# Patient Record
Sex: FEMALE | Race: WHITE | NOT HISPANIC OR LATINO | Employment: OTHER | URBAN - METROPOLITAN AREA
[De-identification: names, ages, dates, MRNs, and addresses within clinical notes are randomized per-mention and may not be internally consistent; named-entity substitution may affect disease eponyms.]

---

## 2017-07-18 ENCOUNTER — CONVERSION ENCOUNTER (OUTPATIENT)
Dept: MAMMOGRAPHY | Facility: HOSPITAL | Age: 77
End: 2017-07-18

## 2018-01-08 ENCOUNTER — CONVERSION ENCOUNTER (OUTPATIENT)
Dept: ORTHOPEDIC SURGERY | Facility: CLINIC | Age: 78
End: 2018-01-08

## 2018-01-08 ENCOUNTER — OFFICE VISIT CONVERTED (OUTPATIENT)
Dept: ORTHOPEDIC SURGERY | Facility: CLINIC | Age: 78
End: 2018-01-08
Attending: ORTHOPAEDIC SURGERY

## 2018-04-03 ENCOUNTER — OFFICE VISIT CONVERTED (OUTPATIENT)
Dept: CARDIOLOGY | Facility: CLINIC | Age: 78
End: 2018-04-03
Attending: SPECIALIST

## 2018-04-03 ENCOUNTER — CONVERSION ENCOUNTER (OUTPATIENT)
Dept: CARDIOLOGY | Facility: CLINIC | Age: 78
End: 2018-04-03

## 2018-04-24 ENCOUNTER — OFFICE VISIT CONVERTED (OUTPATIENT)
Dept: UROLOGY | Facility: CLINIC | Age: 78
End: 2018-04-24
Attending: NURSE PRACTITIONER

## 2018-04-24 ENCOUNTER — CONVERSION ENCOUNTER (OUTPATIENT)
Dept: UROLOGY | Facility: CLINIC | Age: 78
End: 2018-04-24

## 2018-05-10 ENCOUNTER — OFFICE VISIT CONVERTED (OUTPATIENT)
Dept: UROLOGY | Facility: CLINIC | Age: 78
End: 2018-05-10
Attending: UROLOGY

## 2018-05-10 ENCOUNTER — CONVERSION ENCOUNTER (OUTPATIENT)
Dept: UROLOGY | Facility: CLINIC | Age: 78
End: 2018-05-10

## 2018-09-12 ENCOUNTER — OFFICE VISIT CONVERTED (OUTPATIENT)
Dept: ORTHOPEDIC SURGERY | Facility: CLINIC | Age: 78
End: 2018-09-12
Attending: PHYSICIAN ASSISTANT

## 2018-09-12 ENCOUNTER — CONVERSION ENCOUNTER (OUTPATIENT)
Dept: ORTHOPEDIC SURGERY | Facility: CLINIC | Age: 78
End: 2018-09-12

## 2018-10-02 ENCOUNTER — CONVERSION ENCOUNTER (OUTPATIENT)
Dept: UROLOGY | Facility: CLINIC | Age: 78
End: 2018-10-02

## 2018-10-02 ENCOUNTER — OFFICE VISIT CONVERTED (OUTPATIENT)
Dept: UROLOGY | Facility: CLINIC | Age: 78
End: 2018-10-02
Attending: UROLOGY

## 2018-10-04 ENCOUNTER — OFFICE VISIT CONVERTED (OUTPATIENT)
Dept: OTHER | Facility: HOSPITAL | Age: 78
End: 2018-10-04
Attending: NURSE PRACTITIONER

## 2018-10-04 ENCOUNTER — CONVERSION ENCOUNTER (OUTPATIENT)
Dept: OTHER | Facility: HOSPITAL | Age: 78
End: 2018-10-04

## 2018-10-09 ENCOUNTER — OFFICE VISIT CONVERTED (OUTPATIENT)
Dept: CARDIOLOGY | Facility: CLINIC | Age: 78
End: 2018-10-09
Attending: SPECIALIST

## 2018-10-09 ENCOUNTER — CONVERSION ENCOUNTER (OUTPATIENT)
Dept: CARDIOLOGY | Facility: CLINIC | Age: 78
End: 2018-10-09

## 2019-01-04 ENCOUNTER — HOSPITAL ENCOUNTER (OUTPATIENT)
Dept: OTHER | Facility: HOSPITAL | Age: 79
Discharge: HOME | End: 2019-01-04
Attending: NURSE PRACTITIONER

## 2019-01-04 LAB
ANION GAP SERPL CALC-SCNC: 15 MMOL/L (ref 8–19)
BASOPHILS # BLD AUTO: 0.04 10*3/UL (ref 0–0.2)
BASOPHILS # BLD: 0 % (ref 0–3)
BASOPHILS NFR BLD AUTO: 1.01 % (ref 0–3)
BUN SERPL-MCNC: 32 MG/DL (ref 5–25)
BUN/CREAT SERPL: 28 {RATIO} (ref 6–20)
CALCIUM SERPL-MCNC: 10.2 MG/DL (ref 8.7–10.4)
CHLORIDE SERPL-SCNC: 103 MMOL/L (ref 99–111)
CONV ABS BANDS: 0 % (ref 1–5)
CONV ANISOCYTES: SLIGHT
CONV BASOPHILIC STIPPLING IN BLOOD BY LIGHT MICROSCOPY: ABNORMAL
CONV CO2: 31 MMOL/L (ref 22–32)
CONV HYPOCHROMIA IN BLOOD BY LIGHT MICROSCOPY: SLIGHT
CONV SEGMENTED NEUTROPHILS: 61 % (ref 45–70)
CREAT UR-MCNC: 1.15 MG/DL (ref 0.5–0.9)
EOSINOPHIL # BLD AUTO: 0.17 10*3/UL (ref 0–0.7)
EOSINOPHIL # BLD AUTO: 4.62 % (ref 0–7)
EOSINOPHIL NFR BLD AUTO: 3 % (ref 0–7)
ERYTHROCYTE [DISTWIDTH] IN BLOOD BY AUTOMATED COUNT: 12.5 % (ref 11.5–14.5)
GFR SERPLBLD BASED ON 1.73 SQ M-ARVRAT: 45 ML/MIN/{1.73_M2}
GIANT PLATELETS: ABNORMAL
GLUCOSE SERPL-MCNC: 90 MG/DL (ref 65–99)
HBA1C MFR BLD: 14 G/DL (ref 12–16)
HCT VFR BLD AUTO: 42 % (ref 37–47)
HYPOGRANULAR PLATELETS: SLIGHT
LARGE PLATELETS: SLIGHT
LYMPHOCYTES # BLD AUTO: 0.85 10*3/UL (ref 1–5)
MACROCYTES BLD QL SMEAR: SLIGHT
MCH RBC QN AUTO: 29.8 PG (ref 27–31)
MCHC RBC AUTO-ENTMCNC: 33.3 G/DL (ref 33–37)
MCV RBC AUTO: 89.6 FL (ref 81–99)
MICROCYTES BLD QL: SLIGHT
MONOCYTES # BLD AUTO: 0.36 10*3/UL (ref 0.2–1.2)
MONOCYTES NFR BLD AUTO: 9.79 % (ref 3–10)
MONOCYTES NFR BLD MANUAL: 11 % (ref 3–10)
NEUTROPHILS # BLD AUTO: 2.25 10*3/UL (ref 2–8)
NEUTROPHILS NFR BLD AUTO: 61.3 % (ref 30–85)
NRBC BLD AUTO-RTO: 0 % (ref 0–0.01)
NUC CELL # PRT MANUAL: 0 /100{WBCS}
OSMOLALITY SERPL CALC.SUM OF ELEC: 304 MOSM/KG (ref 273–304)
OVALOCYTES BLD QL SMEAR: ABNORMAL
PLAT MORPH BLD: NORMAL
PLATELET # BLD AUTO: 158 10*3/UL (ref 130–400)
PMV BLD AUTO: 8.9 FL (ref 7.4–10.4)
POIKILOCYTOSIS BLD QL SMEAR: SLIGHT
POLYCHROMASIA BLD QL SMEAR: ABNORMAL
POTASSIUM SERPL-SCNC: 4.7 MMOL/L (ref 3.5–5.3)
RBC # BLD AUTO: 4.69 10*6/UL (ref 4.2–5.4)
SMALL PLATELETS BLD QL SMEAR: ADEQUATE
SODIUM SERPL-SCNC: 144 MMOL/L (ref 135–147)
SPHEROCYTES BLD QL SMEAR: ABNORMAL
STOMATOCYTES BLD QL SMEAR: SLIGHT
VARIANT LYMPHS NFR BLD MANUAL: 23.2 % (ref 20–45)
VARIANT LYMPHS NFR BLD MANUAL: 25 % (ref 20–45)
WBC # BLD AUTO: 3.67 10*3/UL (ref 4.8–10.8)

## 2019-01-09 ENCOUNTER — HOSPITAL ENCOUNTER (OUTPATIENT)
Dept: MAMMOGRAPHY | Facility: HOSPITAL | Age: 79
Discharge: HOME | End: 2019-01-09
Attending: OBSTETRICS & GYNECOLOGY

## 2019-03-19 ENCOUNTER — OFFICE VISIT CONVERTED (OUTPATIENT)
Dept: OTHER | Facility: HOSPITAL | Age: 79
End: 2019-03-19
Attending: NURSE PRACTITIONER

## 2019-03-19 ENCOUNTER — HOSPITAL ENCOUNTER (OUTPATIENT)
Dept: OTHER | Facility: HOSPITAL | Age: 79
Discharge: HOME | End: 2019-03-19
Attending: NURSE PRACTITIONER

## 2019-03-19 LAB
ALBUMIN SERPL-MCNC: 4 G/DL (ref 3.5–5)
ALBUMIN/GLOB SERPL: 1.5 {RATIO} (ref 1.4–2.6)
ALP SERPL-CCNC: 42 U/L (ref 43–160)
ALT SERPL-CCNC: 16 U/L (ref 10–40)
ANION GAP SERPL CALC-SCNC: 12 MMOL/L (ref 8–19)
AST SERPL-CCNC: 28 U/L (ref 15–50)
BASOPHILS # BLD AUTO: 0.02 10*3/UL (ref 0–0.2)
BASOPHILS NFR BLD AUTO: 0.6 % (ref 0–3)
BILIRUB SERPL-MCNC: 0.4 MG/DL (ref 0.2–1.3)
BUN SERPL-MCNC: 29 MG/DL (ref 5–25)
BUN/CREAT SERPL: 28 {RATIO} (ref 6–20)
CALCIUM SERPL-MCNC: 10.1 MG/DL (ref 8.7–10.4)
CHLORIDE SERPL-SCNC: 105 MMOL/L (ref 99–111)
CHOLEST SERPL-MCNC: 127 MG/DL (ref 107–200)
CHOLEST/HDLC SERPL: 3.3 {RATIO} (ref 3–6)
CONV ABS IMM GRAN: 0.01 10*3/UL (ref 0–0.2)
CONV CO2: 31 MMOL/L (ref 22–32)
CONV IMMATURE GRAN: 0.3 % (ref 0–1.8)
CONV TOTAL PROTEIN: 6.7 G/DL (ref 6.3–8.2)
CREAT UR-MCNC: 1.05 MG/DL (ref 0.5–0.9)
DEPRECATED RDW RBC AUTO: 49.7 FL (ref 36.4–46.3)
EOSINOPHIL # BLD AUTO: 0.15 10*3/UL (ref 0–0.7)
EOSINOPHIL # BLD AUTO: 4.2 % (ref 0–7)
ERYTHROCYTE [DISTWIDTH] IN BLOOD BY AUTOMATED COUNT: 14.6 % (ref 11.7–14.4)
GFR SERPLBLD BASED ON 1.73 SQ M-ARVRAT: 50 ML/MIN/{1.73_M2}
GLOBULIN UR ELPH-MCNC: 2.7 G/DL (ref 2–3.5)
GLUCOSE SERPL-MCNC: 95 MG/DL (ref 65–99)
HBA1C MFR BLD: 12.9 G/DL (ref 12–16)
HCT VFR BLD AUTO: 40 % (ref 37–47)
HDLC SERPL-MCNC: 39 MG/DL (ref 40–60)
LDLC SERPL CALC-MCNC: 61 MG/DL (ref 70–100)
LYMPHOCYTES # BLD AUTO: 0.69 10*3/UL (ref 1–5)
MCH RBC QN AUTO: 30.1 PG (ref 27–31)
MCHC RBC AUTO-ENTMCNC: 32.3 G/DL (ref 33–37)
MCV RBC AUTO: 93.5 FL (ref 81–99)
MONOCYTES # BLD AUTO: 0.31 10*3/UL (ref 0.2–1.2)
MONOCYTES NFR BLD AUTO: 8.7 % (ref 3–10)
NEUTROPHILS # BLD AUTO: 2.39 10*3/UL (ref 2–8)
NEUTROPHILS NFR BLD AUTO: 66.9 % (ref 30–85)
NRBC CBCN: 0 % (ref 0–0.7)
OSMOLALITY SERPL CALC.SUM OF ELEC: 304 MOSM/KG (ref 273–304)
PLATELET # BLD AUTO: 135 10*3/UL (ref 130–400)
PMV BLD AUTO: 12.1 FL (ref 9.4–12.3)
POTASSIUM SERPL-SCNC: 4.4 MMOL/L (ref 3.5–5.3)
RBC # BLD AUTO: 4.28 10*6/UL (ref 4.2–5.4)
SODIUM SERPL-SCNC: 144 MMOL/L (ref 135–147)
T4 FREE SERPL-MCNC: 1.6 NG/DL (ref 0.9–1.8)
TRIGL SERPL-MCNC: 134 MG/DL (ref 40–150)
TSH SERPL-ACNC: 4.6 M[IU]/L (ref 0.27–4.2)
VARIANT LYMPHS NFR BLD MANUAL: 19.3 % (ref 20–45)
VLDLC SERPL-MCNC: 27 MG/DL (ref 5–37)
WBC # BLD AUTO: 3.57 10*3/UL (ref 4.8–10.8)

## 2019-04-02 ENCOUNTER — OFFICE VISIT CONVERTED (OUTPATIENT)
Dept: UROLOGY | Facility: CLINIC | Age: 79
End: 2019-04-02
Attending: NURSE PRACTITIONER

## 2019-04-02 ENCOUNTER — CONVERSION ENCOUNTER (OUTPATIENT)
Dept: UROLOGY | Facility: CLINIC | Age: 79
End: 2019-04-02

## 2019-04-09 ENCOUNTER — OFFICE VISIT CONVERTED (OUTPATIENT)
Dept: CARDIOLOGY | Facility: CLINIC | Age: 79
End: 2019-04-09
Attending: SPECIALIST

## 2019-04-18 ENCOUNTER — OFFICE VISIT CONVERTED (OUTPATIENT)
Dept: OTHER | Facility: HOSPITAL | Age: 79
End: 2019-04-18
Attending: NURSE PRACTITIONER

## 2019-04-18 ENCOUNTER — CONVERSION ENCOUNTER (OUTPATIENT)
Dept: OTHER | Facility: HOSPITAL | Age: 79
End: 2019-04-18

## 2019-04-18 ENCOUNTER — HOSPITAL ENCOUNTER (OUTPATIENT)
Dept: OTHER | Facility: HOSPITAL | Age: 79
Discharge: HOME | End: 2019-04-18
Attending: NURSE PRACTITIONER

## 2019-04-18 LAB
ALBUMIN SERPL-MCNC: 3.9 G/DL (ref 3.5–5)
ALBUMIN/GLOB SERPL: 1.4 {RATIO} (ref 1.4–2.6)
ALP SERPL-CCNC: 47 U/L (ref 43–160)
ALT SERPL-CCNC: 17 U/L (ref 10–40)
ANION GAP SERPL CALC-SCNC: 15 MMOL/L (ref 8–19)
AST SERPL-CCNC: 30 U/L (ref 15–50)
BASOPHILS # BLD AUTO: 0.03 10*3/UL (ref 0–0.2)
BASOPHILS NFR BLD AUTO: 0.8 % (ref 0–3)
BILIRUB SERPL-MCNC: 0.41 MG/DL (ref 0.2–1.3)
BUN SERPL-MCNC: 35 MG/DL (ref 5–25)
BUN/CREAT SERPL: 29 {RATIO} (ref 6–20)
CALCIUM SERPL-MCNC: 10.1 MG/DL (ref 8.7–10.4)
CHLORIDE SERPL-SCNC: 103 MMOL/L (ref 99–111)
CHOLEST SERPL-MCNC: 122 MG/DL (ref 107–200)
CHOLEST/HDLC SERPL: 3.1 {RATIO} (ref 3–6)
CONV ABS IMM GRAN: 0.01 10*3/UL (ref 0–0.2)
CONV CO2: 30 MMOL/L (ref 22–32)
CONV IMMATURE GRAN: 0.3 % (ref 0–1.8)
CONV TOTAL PROTEIN: 6.6 G/DL (ref 6.3–8.2)
CREAT UR-MCNC: 1.19 MG/DL (ref 0.5–0.9)
DEPRECATED RDW RBC AUTO: 48.3 FL (ref 36.4–46.3)
EOSINOPHIL # BLD AUTO: 0.13 10*3/UL (ref 0–0.7)
EOSINOPHIL # BLD AUTO: 3.7 % (ref 0–7)
ERYTHROCYTE [DISTWIDTH] IN BLOOD BY AUTOMATED COUNT: 14.2 % (ref 11.7–14.4)
GFR SERPLBLD BASED ON 1.73 SQ M-ARVRAT: 43 ML/MIN/{1.73_M2}
GLOBULIN UR ELPH-MCNC: 2.7 G/DL (ref 2–3.5)
GLUCOSE SERPL-MCNC: 104 MG/DL (ref 65–99)
HBA1C MFR BLD: 13 G/DL (ref 12–16)
HCT VFR BLD AUTO: 39.9 % (ref 37–47)
HDLC SERPL-MCNC: 40 MG/DL (ref 40–60)
LDLC SERPL CALC-MCNC: 59 MG/DL (ref 70–100)
LYMPHOCYTES # BLD AUTO: 0.74 10*3/UL (ref 1–5)
MCH RBC QN AUTO: 30.2 PG (ref 27–31)
MCHC RBC AUTO-ENTMCNC: 32.6 G/DL (ref 33–37)
MCV RBC AUTO: 92.8 FL (ref 81–99)
MONOCYTES # BLD AUTO: 0.35 10*3/UL (ref 0.2–1.2)
MONOCYTES NFR BLD AUTO: 9.9 % (ref 3–10)
NEUTROPHILS # BLD AUTO: 2.27 10*3/UL (ref 2–8)
NEUTROPHILS NFR BLD AUTO: 64.3 % (ref 30–85)
NRBC CBCN: 0 % (ref 0–0.7)
OSMOLALITY SERPL CALC.SUM OF ELEC: 306 MOSM/KG (ref 273–304)
PLATELET # BLD AUTO: 141 10*3/UL (ref 130–400)
PMV BLD AUTO: 11.5 FL (ref 9.4–12.3)
POTASSIUM SERPL-SCNC: 4.3 MMOL/L (ref 3.5–5.3)
RBC # BLD AUTO: 4.3 10*6/UL (ref 4.2–5.4)
SODIUM SERPL-SCNC: 144 MMOL/L (ref 135–147)
T4 FREE SERPL-MCNC: 1.6 NG/DL (ref 0.9–1.8)
TRIGL SERPL-MCNC: 116 MG/DL (ref 40–150)
TSH SERPL-ACNC: 4.11 M[IU]/L (ref 0.27–4.2)
VARIANT LYMPHS NFR BLD MANUAL: 21 % (ref 20–45)
VLDLC SERPL-MCNC: 23 MG/DL (ref 5–37)
WBC # BLD AUTO: 3.53 10*3/UL (ref 4.8–10.8)

## 2019-05-07 ENCOUNTER — CONVERSION ENCOUNTER (OUTPATIENT)
Dept: CARDIOLOGY | Facility: CLINIC | Age: 79
End: 2019-05-07
Attending: SPECIALIST

## 2019-05-08 ENCOUNTER — OFFICE VISIT CONVERTED (OUTPATIENT)
Dept: ORTHOPEDIC SURGERY | Facility: CLINIC | Age: 79
End: 2019-05-08
Attending: ORTHOPAEDIC SURGERY

## 2019-05-14 ENCOUNTER — OFFICE VISIT CONVERTED (OUTPATIENT)
Dept: OTHER | Facility: HOSPITAL | Age: 79
End: 2019-05-14
Attending: NURSE PRACTITIONER

## 2019-05-14 ENCOUNTER — CONVERSION ENCOUNTER (OUTPATIENT)
Dept: OTHER | Facility: HOSPITAL | Age: 79
End: 2019-05-14

## 2019-08-22 ENCOUNTER — OFFICE VISIT CONVERTED (OUTPATIENT)
Dept: OTHER | Facility: HOSPITAL | Age: 79
End: 2019-08-22
Attending: NURSE PRACTITIONER

## 2019-08-22 ENCOUNTER — CONVERSION ENCOUNTER (OUTPATIENT)
Dept: OTHER | Facility: HOSPITAL | Age: 79
End: 2019-08-22

## 2019-09-23 ENCOUNTER — OFFICE VISIT CONVERTED (OUTPATIENT)
Dept: OTHER | Facility: HOSPITAL | Age: 79
End: 2019-09-23
Attending: NURSE PRACTITIONER

## 2019-09-23 ENCOUNTER — CONVERSION ENCOUNTER (OUTPATIENT)
Dept: OTHER | Facility: HOSPITAL | Age: 79
End: 2019-09-23

## 2019-10-15 ENCOUNTER — OFFICE VISIT CONVERTED (OUTPATIENT)
Dept: CARDIOLOGY | Facility: CLINIC | Age: 79
End: 2019-10-15
Attending: SPECIALIST

## 2019-11-07 ENCOUNTER — OFFICE VISIT (OUTPATIENT)
Dept: CARDIOLOGY | Facility: CLINIC | Age: 79
End: 2019-11-07

## 2019-11-07 VITALS
DIASTOLIC BLOOD PRESSURE: 90 MMHG | SYSTOLIC BLOOD PRESSURE: 182 MMHG | HEART RATE: 64 BPM | HEIGHT: 64 IN | BODY MASS INDEX: 23.9 KG/M2 | WEIGHT: 140 LBS

## 2019-11-07 DIAGNOSIS — I48.0 PAROXYSMAL ATRIAL FIBRILLATION (HCC): ICD-10-CM

## 2019-11-07 DIAGNOSIS — R06.02 SHORTNESS OF BREATH: ICD-10-CM

## 2019-11-07 DIAGNOSIS — I48.0 AF (PAROXYSMAL ATRIAL FIBRILLATION) (HCC): ICD-10-CM

## 2019-11-07 DIAGNOSIS — E78.5 HYPERLIPIDEMIA, UNSPECIFIED HYPERLIPIDEMIA TYPE: Primary | ICD-10-CM

## 2019-11-07 DIAGNOSIS — I10 ESSENTIAL HYPERTENSION: ICD-10-CM

## 2019-11-07 DIAGNOSIS — E67.3 HYPERVITAMINOSIS D: ICD-10-CM

## 2019-11-07 PROCEDURE — 99203 OFFICE O/P NEW LOW 30 MIN: CPT | Performed by: INTERNAL MEDICINE

## 2019-11-07 PROCEDURE — 93000 ELECTROCARDIOGRAM COMPLETE: CPT | Performed by: INTERNAL MEDICINE

## 2019-11-07 RX ORDER — LANSOPRAZOLE 30 MG/1
30 CAPSULE, DELAYED RELEASE ORAL DAILY
COMMUNITY

## 2019-11-07 RX ORDER — SOTALOL HYDROCHLORIDE 80 MG/1
80 TABLET ORAL 2 TIMES DAILY
COMMUNITY
End: 2022-07-08

## 2019-11-07 RX ORDER — LEVOTHYROXINE SODIUM 0.12 MG/1
125 TABLET ORAL DAILY
COMMUNITY
End: 2022-07-08

## 2019-11-07 RX ORDER — NEBIVOLOL 10 MG/1
10 TABLET ORAL DAILY
Status: ON HOLD | COMMUNITY
End: 2022-10-26

## 2019-11-07 RX ORDER — FUROSEMIDE 40 MG/1
40 TABLET ORAL 2 TIMES DAILY
COMMUNITY

## 2019-11-07 RX ORDER — AMLODIPINE BESYLATE 5 MG/1
5 TABLET ORAL DAILY
Status: ON HOLD | COMMUNITY
End: 2022-10-26

## 2019-11-07 RX ORDER — GABAPENTIN 600 MG/1
600 TABLET ORAL 3 TIMES DAILY
COMMUNITY

## 2019-11-07 RX ORDER — OFLOXACIN 3 MG/ML
5 SOLUTION AURICULAR (OTIC) DAILY
COMMUNITY
End: 2022-07-08

## 2019-11-07 RX ORDER — TEMAZEPAM 15 MG/1
15 CAPSULE ORAL NIGHTLY PRN
COMMUNITY

## 2019-11-07 RX ORDER — CANDESARTAN 32 MG/1
32 TABLET ORAL DAILY
COMMUNITY

## 2019-11-07 NOTE — PROGRESS NOTES
Subjective:     Encounter Date:11/07/2019      Patient ID: Malika Jha is a 79 y.o. female.    Chief Complaint:  Patient is referred for evaluation of paroxysmal atrial fibrillation    HPI:  Ms Jha is an 78 yo patient of Dr. Servin who is seen as a new patient referral for paroxysmal atrial fibrillation.  Her past medical history is significant for HTN,HLD and paroxysmal atrial fibrillation for at least 5 years.  She has been on sotalol for a number of years but has been having more frequent episodes of palpitations which occur sporadically and are associated with weakness and dyspnea.  There are no identifiable precipitating or alleviating factors.  The episodes last for minutes at a time, there have been no prolonged episodes.  She wore an event monitor and I have reviewed the EKG strips which shows underlying sinus rhythm with some episodes of nonsustained atrial tachycardia and atrial fibrillation.        Her cardiac evaluation has included a stress test a couple years ago which she states was normal.  She had an echo 4/19 which showed LVH with an EF of 55% and moderate MR/TR.                       The following portions of the patient's history were reviewed and updated as appropriate: allergies, current medications, past family history, past medical history, past social history, past surgical history and problem list.    Problem List:  Patient Active Problem List   Diagnosis   • Paroxysmal atrial fibrillation (CMS/HCC)   • Essential hypertension   • Hyperlipidemia       Past Medical History:  No past medical history on file.    Past Surgical History:  No past surgical history on file.    Social History:  Social History     Socioeconomic History   • Marital status: Single     Spouse name: Not on file   • Number of children: Not on file   • Years of education: Not on file   • Highest education level: Not on file   Tobacco Use   • Smoking status: Never Smoker   Substance and Sexual Activity   • Alcohol  "use: Defer   • Drug use: Defer   • Sexual activity: Defer       Allergies:  Allergies no active allergies    Immunizations:    There is no immunization history on file for this patient.    ROS:  Review of Systems   Constitution: Positive for malaise/fatigue.   HENT: Negative.    Eyes: Negative.    Cardiovascular: Positive for irregular heartbeat and palpitations. Negative for chest pain and dyspnea on exertion.   Respiratory: Negative for sleep disturbances due to breathing.    Endocrine: Negative.    Hematologic/Lymphatic: Bruises/bleeds easily.   Skin: Negative.    Musculoskeletal: Negative.    Gastrointestinal: Negative.    Genitourinary: Negative.    Neurological: Negative.    Psychiatric/Behavioral: Negative.    Allergic/Immunologic: Negative.           Objective:         BP (!) 182/90   Pulse 64   Ht 162.6 cm (64\")   Wt 63.5 kg (140 lb)   BMI 24.03 kg/m²     Physical Exam   Constitutional: She is oriented to person, place, and time. She appears well-developed and well-nourished. No distress.   HENT:   Head: Normocephalic and atraumatic.   Eyes: Conjunctivae and EOM are normal. Pupils are equal, round, and reactive to light. No scleral icterus.   Neck: Normal range of motion. No thyromegaly present.   Cardiovascular: Normal rate, regular rhythm and normal heart sounds.   Pulmonary/Chest: Effort normal and breath sounds normal.   Abdominal: Soft. Bowel sounds are normal.   Musculoskeletal: Normal range of motion.   Neurological: She is alert and oriented to person, place, and time.   Skin: Skin is warm and dry.   Psychiatric: She has a normal mood and affect.       In-Office Procedure(s):    ECG 12 Lead  Date/Time: 11/7/2019 12:17 PM  Performed by: Galo Mckee MD  Authorized by: Galo Mckee MD   Previous ECG: no previous ECG available  Rhythm: sinus rhythm  Ectopy: unifocal PVCs  Rate: bradycardic  QRS axis: normal    Clinical impression: normal ECG            ASCVD RIsk Score::  The ASCVD " Risk score (Tori JACKSON Jr., et al., 2013) failed to calculate for the following reasons:    The 2013 ASCVD risk score is only valid for ages 40 to 79    Recent Radiology:  Imaging Results (Most Recent)     None          Lab Review:   No results found for any previous visit.                Assessment:          Diagnosis Plan   1. Hyperlipidemia, unspecified hyperlipidemia type  Comprehensive Metabolic Panel    TSH    CBC & Differential    Vitamin D 25 Hydroxy    PTH Intact (Serial Monitor)   2. AF (paroxysmal atrial fibrillation) (CMS/HCC)  Vitamin D 25 Hydroxy    flecainide (TAMBOCOR) 50 MG tablet   3. Shortness of breath  Vitamin D 25 Hydroxy   4. Hypervitaminosis D   Vitamin D 25 Hydroxy   5. Paroxysmal atrial fibrillation (CMS/HCC)     6. Essential hypertension            Plan:      1. Paroxysmal atrial fibrillation - episodes have increased in frequency despite sotalol.  Discussed options of alternative antiarrhythmic drug therapy vs ablation with the associated risks and benefits of each.  She does not want an ablation and prefers an alternative medication.  There is no history of CAD or MI and she has had a normal stress test, EF is normal.  Will stop sotalol and begin flecainide 50mg bid after 3 days afterward.  Check EKG in 1 week.        Level of Care:                 Galo Mckee MD  11/07/19  .

## 2019-11-08 PROBLEM — I48.0 PAROXYSMAL ATRIAL FIBRILLATION (HCC): Status: ACTIVE | Noted: 2019-11-08

## 2019-11-08 PROBLEM — E78.5 HYPERLIPIDEMIA: Status: ACTIVE | Noted: 2019-11-08

## 2019-11-08 PROBLEM — I10 ESSENTIAL HYPERTENSION: Status: ACTIVE | Noted: 2019-11-08

## 2019-11-08 RX ORDER — FLECAINIDE ACETATE 50 MG/1
50 TABLET ORAL 2 TIMES DAILY
Qty: 180 TABLET | Refills: 3 | Status: SHIPPED | OUTPATIENT
Start: 2019-11-08 | End: 2022-07-08

## 2019-11-13 DIAGNOSIS — I48.0 PAROXYSMAL ATRIAL FIBRILLATION (HCC): Primary | ICD-10-CM

## 2019-11-14 ENCOUNTER — RESULTS ENCOUNTER (OUTPATIENT)
Dept: CARDIOLOGY | Facility: CLINIC | Age: 79
End: 2019-11-14

## 2019-11-14 DIAGNOSIS — E78.5 HYPERLIPIDEMIA, UNSPECIFIED HYPERLIPIDEMIA TYPE: ICD-10-CM

## 2019-11-14 DIAGNOSIS — E67.3 HYPERVITAMINOSIS D: ICD-10-CM

## 2019-11-14 DIAGNOSIS — R06.02 SHORTNESS OF BREATH: ICD-10-CM

## 2019-11-14 DIAGNOSIS — I48.0 AF (PAROXYSMAL ATRIAL FIBRILLATION) (HCC): ICD-10-CM

## 2019-12-10 ENCOUNTER — OFFICE VISIT CONVERTED (OUTPATIENT)
Dept: UROLOGY | Facility: CLINIC | Age: 79
End: 2019-12-10
Attending: NURSE PRACTITIONER

## 2019-12-10 ENCOUNTER — CONVERSION ENCOUNTER (OUTPATIENT)
Dept: UROLOGY | Facility: CLINIC | Age: 79
End: 2019-12-10

## 2019-12-18 ENCOUNTER — OFFICE VISIT CONVERTED (OUTPATIENT)
Dept: OTHER | Facility: HOSPITAL | Age: 79
End: 2019-12-18
Attending: NURSE PRACTITIONER

## 2019-12-18 ENCOUNTER — HOSPITAL ENCOUNTER (OUTPATIENT)
Dept: OTHER | Facility: HOSPITAL | Age: 79
Discharge: HOME | End: 2019-12-18
Attending: NURSE PRACTITIONER

## 2019-12-18 ENCOUNTER — CONVERSION ENCOUNTER (OUTPATIENT)
Dept: OTHER | Facility: HOSPITAL | Age: 79
End: 2019-12-18

## 2019-12-18 LAB
ALBUMIN SERPL-MCNC: 4.3 G/DL (ref 3.5–5)
ALBUMIN/GLOB SERPL: 1.5 {RATIO} (ref 1.4–2.6)
ALP SERPL-CCNC: 53 U/L (ref 43–160)
ALT SERPL-CCNC: 15 U/L (ref 10–40)
ANION GAP SERPL CALC-SCNC: 16 MMOL/L (ref 8–19)
AST SERPL-CCNC: 32 U/L (ref 15–50)
BASOPHILS # BLD AUTO: 0.03 10*3/UL (ref 0–0.2)
BASOPHILS NFR BLD AUTO: 0.8 % (ref 0–3)
BILIRUB SERPL-MCNC: 0.41 MG/DL (ref 0.2–1.3)
BUN SERPL-MCNC: 31 MG/DL (ref 5–25)
BUN/CREAT SERPL: 25 {RATIO} (ref 6–20)
CALCIUM SERPL-MCNC: 11.2 MG/DL (ref 8.7–10.4)
CHLORIDE SERPL-SCNC: 101 MMOL/L (ref 99–111)
CHOLEST SERPL-MCNC: 129 MG/DL (ref 107–200)
CHOLEST/HDLC SERPL: 2.9 {RATIO} (ref 3–6)
CONV ABS IMM GRAN: 0.02 10*3/UL (ref 0–0.2)
CONV CO2: 30 MMOL/L (ref 22–32)
CONV IMMATURE GRAN: 0.6 % (ref 0–1.8)
CONV TOTAL PROTEIN: 7.1 G/DL (ref 6.3–8.2)
CREAT UR-MCNC: 1.25 MG/DL (ref 0.5–0.9)
DEPRECATED RDW RBC AUTO: 46.4 FL (ref 36.4–46.3)
EOSINOPHIL # BLD AUTO: 0.12 10*3/UL (ref 0–0.7)
EOSINOPHIL # BLD AUTO: 3.4 % (ref 0–7)
ERYTHROCYTE [DISTWIDTH] IN BLOOD BY AUTOMATED COUNT: 13.6 % (ref 11.7–14.4)
GFR SERPLBLD BASED ON 1.73 SQ M-ARVRAT: 40 ML/MIN/{1.73_M2}
GLOBULIN UR ELPH-MCNC: 2.8 G/DL (ref 2–3.5)
GLUCOSE SERPL-MCNC: 100 MG/DL (ref 65–99)
HCT VFR BLD AUTO: 40.7 % (ref 37–47)
HDLC SERPL-MCNC: 45 MG/DL (ref 40–60)
HGB BLD-MCNC: 13.3 G/DL (ref 12–16)
LDLC SERPL CALC-MCNC: 66 MG/DL (ref 70–100)
LYMPHOCYTES # BLD AUTO: 0.67 10*3/UL (ref 1–5)
LYMPHOCYTES NFR BLD AUTO: 19 % (ref 20–45)
MCH RBC QN AUTO: 30.2 PG (ref 27–31)
MCHC RBC AUTO-ENTMCNC: 32.7 G/DL (ref 33–37)
MCV RBC AUTO: 92.5 FL (ref 81–99)
MONOCYTES # BLD AUTO: 0.3 10*3/UL (ref 0.2–1.2)
MONOCYTES NFR BLD AUTO: 8.5 % (ref 3–10)
NEUTROPHILS # BLD AUTO: 2.39 10*3/UL (ref 2–8)
NEUTROPHILS NFR BLD AUTO: 67.7 % (ref 30–85)
NRBC CBCN: 0 % (ref 0–0.7)
OSMOLALITY SERPL CALC.SUM OF ELEC: 303 MOSM/KG (ref 273–304)
PLATELET # BLD AUTO: 147 10*3/UL (ref 130–400)
PMV BLD AUTO: 11.9 FL (ref 9.4–12.3)
POTASSIUM SERPL-SCNC: 4.1 MMOL/L (ref 3.5–5.3)
RBC # BLD AUTO: 4.4 10*6/UL (ref 4.2–5.4)
SODIUM SERPL-SCNC: 143 MMOL/L (ref 135–147)
TRIGL SERPL-MCNC: 90 MG/DL (ref 40–150)
TSH SERPL-ACNC: 5.85 M[IU]/L (ref 0.27–4.2)
VLDLC SERPL-MCNC: 18 MG/DL (ref 5–37)
WBC # BLD AUTO: 3.53 10*3/UL (ref 4.8–10.8)

## 2019-12-19 ENCOUNTER — OFFICE VISIT (OUTPATIENT)
Dept: CARDIOLOGY | Facility: CLINIC | Age: 79
End: 2019-12-19

## 2019-12-19 VITALS
HEART RATE: 56 BPM | RESPIRATION RATE: 16 BRPM | DIASTOLIC BLOOD PRESSURE: 64 MMHG | SYSTOLIC BLOOD PRESSURE: 154 MMHG | WEIGHT: 139 LBS | BODY MASS INDEX: 23.73 KG/M2 | HEIGHT: 64 IN

## 2019-12-19 DIAGNOSIS — I48.0 PAROXYSMAL ATRIAL FIBRILLATION (HCC): Primary | ICD-10-CM

## 2019-12-19 PROCEDURE — 93000 ELECTROCARDIOGRAM COMPLETE: CPT | Performed by: INTERNAL MEDICINE

## 2019-12-19 PROCEDURE — 99212 OFFICE O/P EST SF 10 MIN: CPT | Performed by: INTERNAL MEDICINE

## 2019-12-20 NOTE — PROGRESS NOTES
Subjective:     Encounter Date:12/19/2019      Patient ID: Malika Jha is a 79 y.o. female.    Chief Complaint:  Followup paroxysmal Afib    HPI:  Patient presents for followup of paroxysmal Afib after being started on flecainide.  She is an 80 yo patient of Dr. Servin who has a past medical history significant for HTN, HLD and PAF for about 5 years.  She had been on sotalol but began having  More frequent and prolonged episodes of AF associated with weakness, fatigue and dyspnea.    Her cardiac evaluation included a stress test a couple years ago which was normal.  An echo 4/19 showed LVH with an EF of 55% and moderate MR/TR.  An event monitor showed episodes of AF and atrial tachycardia.    She was seen last month and we discussed options of alternative antiarrhythmic drug vs ablation.  She preferred medical therapy.  We stopped her sotalol and placed her on flecainide.  She has not had any side effects related to the flecainide and had only 1 nonsustained episode of AF shortly after beginning it.  Since then she has done well and feels like she is markedly improved.      The following portions of the patient's history were reviewed and updated as appropriate: allergies, current medications, past family history, past medical history, past social history, past surgical history and problem list.    Problem List:  Patient Active Problem List   Diagnosis   • Paroxysmal atrial fibrillation (CMS/HCC)   • Essential hypertension   • Hyperlipidemia       Past Medical History:  No past medical history on file.    Past Surgical History:  No past surgical history on file.    Social History:  Social History     Socioeconomic History   • Marital status: Single     Spouse name: Not on file   • Number of children: Not on file   • Years of education: Not on file   • Highest education level: Not on file   Tobacco Use   • Smoking status: Never Smoker   Substance and Sexual Activity   • Alcohol use: Defer   • Drug use: Defer  "  • Sexual activity: Defer       Allergies:  Allergies   Allergen Reactions   • Medrol [Methylprednisolone] Shortness Of Breath   • Penicillins Hives       Immunizations:    There is no immunization history on file for this patient.    ROS:  Review of Systems   Constitution: Negative for malaise/fatigue.   Cardiovascular: Positive for palpitations. Negative for chest pain, dyspnea on exertion, irregular heartbeat and syncope.   Respiratory: Negative for shortness of breath.    All other systems reviewed and are negative.         Objective:         /64   Pulse 56   Resp 16   Ht 162.6 cm (64\")   Wt 63 kg (139 lb)   BMI 23.86 kg/m²     Physical Exam   Constitutional: She is oriented to person, place, and time. She appears well-developed and well-nourished. No distress.   HENT:   Head: Normocephalic and atraumatic.   Eyes: Pupils are equal, round, and reactive to light. Conjunctivae and EOM are normal. No scleral icterus.   Neck: Normal range of motion. No thyromegaly present.   Cardiovascular: Normal rate, regular rhythm and normal heart sounds.   Pulmonary/Chest: Effort normal and breath sounds normal.   Abdominal: Soft. Bowel sounds are normal.   Musculoskeletal: Normal range of motion.   Neurological: She is alert and oriented to person, place, and time.   Skin: Skin is warm and dry.   Psychiatric: She has a normal mood and affect.       In-Office Procedure(s):    ECG 12 Lead  Date/Time: 12/19/2019 7:50 AM  Performed by: Galo Mckee MD  Authorized by: Galo Mckee MD   Comparison: compared with previous ECG from 11/15/2019  Comparison to previous ECG: SB, normal  Rhythm: sinus rhythm and sinus bradycardia  Rate: normal  QRS axis: normal    Clinical impression: normal ECG            ASCVD RIsk Score::  The ASCVD Risk score (Tori MANUEL Jr., et al., 2013) failed to calculate for the following reasons:    The 2013 ASCVD risk score is only valid for ages 40 to 79    Recent Radiology:  Imaging " Results (Most Recent)     None          Lab Review:   No visits with results within 2 Month(s) from this visit.   Latest known visit with results is:   No results found for any previous visit.                Assessment:          Diagnosis Plan   1. Paroxysmal atrial fibrillation (CMS/HCC)            Plan:      1. Paroxysmal AF - doing well on flecainide, remains in NSR, QRS duration ok.  Will continue same.    She will followup with Dr. Servin      Level of Care:                 Galo Mckee MD  12/20/19  .

## 2020-01-14 ENCOUNTER — OFFICE VISIT CONVERTED (OUTPATIENT)
Dept: CARDIOLOGY | Facility: CLINIC | Age: 80
End: 2020-01-14
Attending: SPECIALIST

## 2020-02-06 ENCOUNTER — CONVERSION ENCOUNTER (OUTPATIENT)
Dept: UROLOGY | Facility: CLINIC | Age: 80
End: 2020-02-06

## 2020-02-06 ENCOUNTER — OFFICE VISIT CONVERTED (OUTPATIENT)
Dept: UROLOGY | Facility: CLINIC | Age: 80
End: 2020-02-06
Attending: UROLOGY

## 2020-02-24 ENCOUNTER — OFFICE VISIT CONVERTED (OUTPATIENT)
Dept: ORTHOPEDIC SURGERY | Facility: CLINIC | Age: 80
End: 2020-02-24
Attending: ORTHOPAEDIC SURGERY

## 2020-03-24 ENCOUNTER — TELEMEDICINE CONVERTED (OUTPATIENT)
Dept: OTHER | Facility: HOSPITAL | Age: 80
End: 2020-03-24
Attending: NURSE PRACTITIONER

## 2020-05-26 ENCOUNTER — TELEPHONE CONVERTED (OUTPATIENT)
Dept: OTHER | Facility: HOSPITAL | Age: 80
End: 2020-05-26
Attending: NURSE PRACTITIONER

## 2020-05-28 ENCOUNTER — HOSPITAL ENCOUNTER (OUTPATIENT)
Dept: OTHER | Facility: HOSPITAL | Age: 80
Discharge: HOME | End: 2020-05-28
Attending: NURSE PRACTITIONER

## 2020-05-28 LAB
ALBUMIN SERPL-MCNC: 4 G/DL (ref 3.5–5)
ALBUMIN/GLOB SERPL: 1.4 {RATIO} (ref 1.4–2.6)
ALP SERPL-CCNC: 57 U/L (ref 43–160)
ALT SERPL-CCNC: 14 U/L (ref 10–40)
ANION GAP SERPL CALC-SCNC: 15 MMOL/L (ref 8–19)
AST SERPL-CCNC: 31 U/L (ref 15–50)
BASOPHILS # BLD AUTO: 0.04 10*3/UL (ref 0–0.2)
BASOPHILS NFR BLD AUTO: 1.1 % (ref 0–3)
BILIRUB SERPL-MCNC: 0.43 MG/DL (ref 0.2–1.3)
BUN SERPL-MCNC: 30 MG/DL (ref 5–25)
BUN/CREAT SERPL: 24 {RATIO} (ref 6–20)
CALCIUM SERPL-MCNC: 10.3 MG/DL (ref 8.7–10.4)
CHLORIDE SERPL-SCNC: 106 MMOL/L (ref 99–111)
CHOLEST SERPL-MCNC: 114 MG/DL (ref 107–200)
CHOLEST/HDLC SERPL: 3.3 {RATIO} (ref 3–6)
CONV ABS IMM GRAN: 0.01 10*3/UL (ref 0–0.2)
CONV CO2: 27 MMOL/L (ref 22–32)
CONV IMMATURE GRAN: 0.3 % (ref 0–1.8)
CONV TOTAL PROTEIN: 6.8 G/DL (ref 6.3–8.2)
CREAT UR-MCNC: 1.27 MG/DL (ref 0.5–0.9)
DEPRECATED RDW RBC AUTO: 48.4 FL (ref 36.4–46.3)
EOSINOPHIL # BLD AUTO: 0.15 10*3/UL (ref 0–0.7)
EOSINOPHIL # BLD AUTO: 3.9 % (ref 0–7)
ERYTHROCYTE [DISTWIDTH] IN BLOOD BY AUTOMATED COUNT: 13.9 % (ref 11.7–14.4)
GFR SERPLBLD BASED ON 1.73 SQ M-ARVRAT: 40 ML/MIN/{1.73_M2}
GLOBULIN UR ELPH-MCNC: 2.8 G/DL (ref 2–3.5)
GLUCOSE SERPL-MCNC: 106 MG/DL (ref 65–99)
HCT VFR BLD AUTO: 40.7 % (ref 37–47)
HDLC SERPL-MCNC: 35 MG/DL (ref 40–60)
HGB BLD-MCNC: 12.8 G/DL (ref 12–16)
LDLC SERPL CALC-MCNC: 57 MG/DL (ref 70–100)
LYMPHOCYTES # BLD AUTO: 0.65 10*3/UL (ref 1–5)
LYMPHOCYTES NFR BLD AUTO: 17.1 % (ref 20–45)
MCH RBC QN AUTO: 29.6 PG (ref 27–31)
MCHC RBC AUTO-ENTMCNC: 31.4 G/DL (ref 33–37)
MCV RBC AUTO: 94 FL (ref 81–99)
MONOCYTES # BLD AUTO: 0.37 10*3/UL (ref 0.2–1.2)
MONOCYTES NFR BLD AUTO: 9.7 % (ref 3–10)
NEUTROPHILS # BLD AUTO: 2.58 10*3/UL (ref 2–8)
NEUTROPHILS NFR BLD AUTO: 67.9 % (ref 30–85)
NRBC CBCN: 0 % (ref 0–0.7)
OSMOLALITY SERPL CALC.SUM OF ELEC: 303 MOSM/KG (ref 273–304)
PLATELET # BLD AUTO: 159 10*3/UL (ref 130–400)
PMV BLD AUTO: 11.2 FL (ref 9.4–12.3)
POTASSIUM SERPL-SCNC: 4.5 MMOL/L (ref 3.5–5.3)
RBC # BLD AUTO: 4.33 10*6/UL (ref 4.2–5.4)
SODIUM SERPL-SCNC: 143 MMOL/L (ref 135–147)
T4 FREE SERPL-MCNC: 1.4 NG/DL (ref 0.9–1.8)
TRIGL SERPL-MCNC: 108 MG/DL (ref 40–150)
TSH SERPL-ACNC: 5.08 M[IU]/L (ref 0.27–4.2)
VLDLC SERPL-MCNC: 22 MG/DL (ref 5–37)
WBC # BLD AUTO: 3.8 10*3/UL (ref 4.8–10.8)

## 2020-06-10 ENCOUNTER — HOSPITAL ENCOUNTER (OUTPATIENT)
Dept: OTHER | Facility: HOSPITAL | Age: 80
Discharge: HOME | End: 2020-06-10
Attending: SPECIALIST

## 2020-06-10 LAB — BNP SERPL-MCNC: 2459 PG/ML (ref 0–1800)

## 2020-06-26 ENCOUNTER — OFFICE VISIT CONVERTED (OUTPATIENT)
Dept: UROLOGY | Facility: CLINIC | Age: 80
End: 2020-06-26
Attending: UROLOGY

## 2020-06-26 ENCOUNTER — HOSPITAL ENCOUNTER (OUTPATIENT)
Dept: UROLOGY | Facility: CLINIC | Age: 80
Discharge: HOME | End: 2020-06-26
Attending: UROLOGY

## 2020-06-26 ENCOUNTER — CONVERSION ENCOUNTER (OUTPATIENT)
Dept: UROLOGY | Facility: CLINIC | Age: 80
End: 2020-06-26

## 2020-06-29 LAB
AMPICILLIN SUSC ISLT: <=2
BACTERIA UR CULT: ABNORMAL
CIPROFLOXACIN SUSC ISLT: <=0.5
CONV GENTAMICIN HIGH LEVEL SYNERGY: ABNORMAL
CONV STREPTOMYCIN HIGH LEVEL SYNERGY: ABNORMAL
DAPTOMYCIN SUSC ISLT: 4
DOXYCYCLINE SUSC ISLT: 8
ERYTHROMYCIN SUSC ISLT: 4
LEVOFLOXACIN SUSC ISLT: 1
LINEZOLID SUSC ISLT: 2
NITROFURANTOIN SUSC ISLT: <=16
TETRACYCLINE SUSC ISLT: >=16
VANCOMYCIN SUSC ISLT: 1

## 2020-07-07 ENCOUNTER — CONVERSION ENCOUNTER (OUTPATIENT)
Dept: UROLOGY | Facility: CLINIC | Age: 80
End: 2020-07-07

## 2020-07-07 ENCOUNTER — OFFICE VISIT CONVERTED (OUTPATIENT)
Dept: UROLOGY | Facility: CLINIC | Age: 80
End: 2020-07-07
Attending: UROLOGY

## 2020-07-17 ENCOUNTER — CONVERSION ENCOUNTER (OUTPATIENT)
Dept: CARDIOLOGY | Facility: CLINIC | Age: 80
End: 2020-07-17

## 2020-07-17 ENCOUNTER — OFFICE VISIT CONVERTED (OUTPATIENT)
Dept: CARDIOLOGY | Facility: CLINIC | Age: 80
End: 2020-07-17
Attending: SPECIALIST

## 2020-07-30 ENCOUNTER — HOSPITAL ENCOUNTER (OUTPATIENT)
Dept: UROLOGY | Facility: CLINIC | Age: 80
Discharge: HOME | End: 2020-07-30
Attending: NURSE PRACTITIONER

## 2020-07-30 ENCOUNTER — OFFICE VISIT CONVERTED (OUTPATIENT)
Dept: UROLOGY | Facility: CLINIC | Age: 80
End: 2020-07-30
Attending: NURSE PRACTITIONER

## 2020-08-01 LAB
AMOXICILLIN+CLAV SUSC ISLT: <=2
AMPICILLIN SUSC ISLT: <=2
AMPICILLIN+SULBAC SUSC ISLT: <=2
BACTERIA UR CULT: ABNORMAL
CEFAZOLIN SUSC ISLT: <=4
CEFEPIME SUSC ISLT: <=1
CEFTAZIDIME SUSC ISLT: <=1
CEFTRIAXONE SUSC ISLT: <=1
CEFUROXIME ORAL SUSC ISLT: <=1
CEFUROXIME PARENTER SUSC ISLT: <=1
CIPROFLOXACIN SUSC ISLT: <=0.25
ERTAPENEM SUSC ISLT: <=0.5
GENTAMICIN SUSC ISLT: <=1
LEVOFLOXACIN SUSC ISLT: <=0.12
NITROFURANTOIN SUSC ISLT: 128
TETRACYCLINE SUSC ISLT: >=16
TMP SMX SUSC ISLT: <=20
TOBRAMYCIN SUSC ISLT: <=1

## 2020-08-06 ENCOUNTER — OFFICE VISIT CONVERTED (OUTPATIENT)
Dept: OTHER | Facility: HOSPITAL | Age: 80
End: 2020-08-06
Attending: NURSE PRACTITIONER

## 2020-08-06 ENCOUNTER — CONVERSION ENCOUNTER (OUTPATIENT)
Dept: OTHER | Facility: HOSPITAL | Age: 80
End: 2020-08-06

## 2020-08-10 ENCOUNTER — OFFICE VISIT CONVERTED (OUTPATIENT)
Dept: UROLOGY | Facility: CLINIC | Age: 80
End: 2020-08-10
Attending: NURSE PRACTITIONER

## 2020-08-10 ENCOUNTER — CONVERSION ENCOUNTER (OUTPATIENT)
Dept: UROLOGY | Facility: CLINIC | Age: 80
End: 2020-08-10

## 2020-08-10 ENCOUNTER — HOSPITAL ENCOUNTER (OUTPATIENT)
Dept: UROLOGY | Facility: CLINIC | Age: 80
Discharge: HOME | End: 2020-08-10
Attending: NURSE PRACTITIONER

## 2020-08-12 LAB — BACTERIA UR CULT: NORMAL

## 2020-10-12 ENCOUNTER — CONVERSION ENCOUNTER (OUTPATIENT)
Dept: UROLOGY | Facility: CLINIC | Age: 80
End: 2020-10-12

## 2020-10-12 ENCOUNTER — OFFICE VISIT CONVERTED (OUTPATIENT)
Dept: UROLOGY | Facility: CLINIC | Age: 80
End: 2020-10-12
Attending: NURSE PRACTITIONER

## 2020-10-28 ENCOUNTER — HOSPITAL ENCOUNTER (OUTPATIENT)
Dept: MAMMOGRAPHY | Facility: HOSPITAL | Age: 80
Discharge: HOME | End: 2020-10-28
Attending: NURSE PRACTITIONER

## 2020-10-29 ENCOUNTER — HOSPITAL ENCOUNTER (OUTPATIENT)
Dept: UROLOGY | Facility: CLINIC | Age: 80
Discharge: HOME | End: 2020-10-29
Attending: NURSE PRACTITIONER

## 2020-10-29 ENCOUNTER — OFFICE VISIT CONVERTED (OUTPATIENT)
Dept: UROLOGY | Facility: CLINIC | Age: 80
End: 2020-10-29
Attending: NURSE PRACTITIONER

## 2020-11-01 LAB
AMPICILLIN SUSC ISLT: <=2
AMPICILLIN+SULBAC SUSC ISLT: <=2
BACTERIA UR CULT: ABNORMAL
CEFAZOLIN SUSC ISLT: <=4
CEFEPIME SUSC ISLT: <=0.12
CEFTAZIDIME SUSC ISLT: <=1
CEFTRIAXONE SUSC ISLT: <=0.25
CIPROFLOXACIN SUSC ISLT: <=0.25
ERTAPENEM SUSC ISLT: <=0.12
GENTAMICIN SUSC ISLT: <=1
LEVOFLOXACIN SUSC ISLT: <=0.12
NITROFURANTOIN SUSC ISLT: 128
PIP+TAZO SUSC ISLT: <=4
TMP SMX SUSC ISLT: <=20
TOBRAMYCIN SUSC ISLT: <=1

## 2020-11-04 ENCOUNTER — HOSPITAL ENCOUNTER (OUTPATIENT)
Dept: GENERAL RADIOLOGY | Facility: HOSPITAL | Age: 80
Discharge: HOME | End: 2020-11-04
Attending: NURSE PRACTITIONER

## 2021-05-13 NOTE — PROGRESS NOTES
Quick Note      Patient Name: Malika Jha   Patient ID: [id number]   Sex: Female   YOB: 1940    Primary Care Provider: Noa MUNOZ   Referring Provider: Noa MUNOZ    Visit Date: May 26, 2020    Provider: ALEXANDER Blake   Location: [facility]   Location Address: [address] [place] [address]  [id number]   Location Phone: [phone]          History Of Present Illness  TELEHEALTH TELEPHONE VISIT  Chief Complaint: Follow up   Malika Jha is a 79 year old /White female who is presenting for evaluation via telehealth telephone visit. Verbal consent obtained before beginning visit.   Provider spent 11 minutes with the patient during telehealth visit.   The following staff were present during this visit: ALEXANDER Velazquez and Rebeca Whitaker CMA   Past Medical History/Overview of Patient Symptoms  Malika Jha is a 79 year old /White female who presents for evaluation and treatment of:      *Patient consented to consult via telephone    Follow up on Hypertension, Hyperlipidemia, Paroxysmal A fib, and Insomnia. Patient follows with cardiology for paroxysmal atrial fibrillation and coronary artery disease.  She is anticoagulated with Eliquis, and rate controlled with Bystolic.  She is on amlodipine, candesartan, and furosemide for hypertension. She states that her BP has been pretty good, she hasn't been writing it down. She is on Synthroid for hypothyroidism.  She is on TriCor for hyperlipidemia tolerates well denies side effects.  Patient is on temazepam for insomnia tolerates well denies side effects.  She was recently switched to this from lorazepam and admits that it works well, she is able to get a good 6 to 8 hours of rest.  Howard and urine drug screen are both up-to-date.    She was seen last month for this chronic cough with thick mucus, we felt that that time it was either acid reflux versus allergic rhinitis.  She is on daily lansoprazole  and Zyrtec. She stated that she never started the Zyrtec that her insurance wouldn't pay for it.     States she had a fall Saturday morning and went to [facility] for eval due to large knot on head and back pain. States imaging was normal. Was prescribed cethalexin 500mg for UTI.    *Calling [facility] for med records    Time In: 0740  Time Out: 0751           Assessment  · Hyperlipidemia     272.4/E78.5  · Hypothyroidism     244.9/E03.9  · Paroxysmal atrial fibrillation     427.31/I48.0  · Hypertension     401.9/I10  · Insomnia     780.52/G47.00  · Seasonal allergies     477.9/J30.2  · Reflux     530.81/K21.9    Problems Reconciled  Plan  · Orders  o Physical, Primary Care Panel (CBC, CMP, Lipid, TSH) [facility] (88540, 35326, 29491, 45781) - 401.9/I10, 272.4/E78.5, 244.9/E03.9 - 05/26/2020  o Thyroid Profile (TSH, FT4) (70111, 48470, THYII) - 244.9/E03.9 - 05/26/2020  o Physician Telephone Evaluation, 11-20 minutes (90039) - - 05/26/2020  o MARISA Report (KASPR) - 780.52/G47.00 - 05/26/2020  o ACO-39: Current medications updated and reviewed () - - 05/26/2020  o ACO-13: Fall Risk Screening with 2 or more falls in past year or any fall with injury in the past year (1100F) - - 05/26/2020  · Medications  o Medications have been Reconciled  o Transition of Care or Provider Policy  · Instructions  o Plan Of Care: Continue current plan of care, patient will come back in the office Monday this week to have routine lab work drawn. Patient continues to complain of some thick mucus in her throat, states she never started the Zyrtec as her insurance would not pay, advised her to pick over-the-counter Zyrtec up and try. Patient voiced understanding all questions answered.  o Chronic conditions reviewed and taken into consideration for today's treatment plan.  o Patient is taking medications as prescribed and doing well.   o Call the office with any concerns or questions.  o Discussed Covid-19 precautions  including, but not limited to, social distancing, avoid touching your face, and hand washing.   · Disposition  o Follow Up in 3 months     We will see Malika back in 3 months for routine follow-up.  Advised if she had any issues or concerns before then to please follow-up sooner.    EMR dragon/transcription disclaimer: Much of this encounter note is an electronic transcription/translation of spoken language to printed text.  Electronic translation of spoken language may permit erroneous, or at times nonsensical words or phrases to be inadvertently transcribed; although I have reviewed the note for such errors, some may still exist.             Electronically Signed by: ALEXANDER Blake -Author on May 26, 2020 08:53:47 AM

## 2021-05-13 NOTE — PROGRESS NOTES
Progress Note      Patient Name: Malika Jha   Patient ID: [id number]   Sex: Female   YOB: 1940    Primary Care Provider: Noa MUNOZ   Referring Provider: Noa MUNOZ    Visit Date: August 6, 2020    Provider: ALEXANDER Blake   Location: [facility]   Location Address: [address] [place] [address]  [id number]   Location Phone: [phone]          Chief Complaint  · Follow up      History Of Present Illness  Malika Jha is a 80 year old /White female who presents for evaluation and treatment of:      Follow up on paroxysmal A fib, Hypertension, Insomnia. Patient is on temazepam for insomnia tolerates well denies side effects he is able to rest with this medication.  She does follow with cardiology for paroxysmal atrial fibrillation and coronary artery disease.  She is anticoagulated with Eliquis and rate controlled with Bystolic.  She is on amlodipine and candesartan for hypertension which is under good control today.  She is on furosemide for edema.    C/O increase in weakness, and fatigue. States that her legs are getting really weak when she stands for long periods of time. They will get weak when she is washing her hair which takes a short amount of time. Using a cane to ambulate.     Howard and urine drug screen are both up-to-date.    Mammogram order, scheduled at [facility]    Currently on Keflex for uti, from dr. Prajapati    Complains of fatigue/weakness, heart flutters, leg weakness. Has been having some edema in her lower extremities. Has been following with Dr. Servin but wants referral to Dr. Nickerson.         Past Medical History  Disease Name Date Onset Notes   Anemia --  --    Anxiety --  --    Arthritis --  --    Atrial Fibrillation --  --    Bladder Disorder --  --    Cancer --  --    Cataracts, bilateral --  --    CKD (chronic kidney disease) --  --    Colon abnormality --  --    Cystocele --  --    Heart Disease --  --    Hyperlipemia --  --     Hypertension --  --    Hypothyroidism --  --    Insomnia --  --    Leg cramps --  --    Limb Swelling --  --    Nocturia --  --    Overactive bladder 02/06/2020 --    Palpitations 09/16/2016 --    Pancreatitis --  --    Paroxysmal atrial fibrillation 05/14/2019 --    Pyuria 06/26/2020 --    Recurrent UTI (urinary tract infection) --  --    Reflux --  --    Renal calculus or stone --  --    Rosacea --  --    Screening for colon cancer 3/2018 cologuard-negative   Screening Mammogram 01/2019 --    Seasonal allergies --  --    Shortness of Breath --  --    Stage 3 chronic kidney disease 03/19/2019 --    Stomach Disorder --  --    Stress Incontinence 10/02/2018 --    Thyroid disorder --  --          Past Surgical History  Procedure Name Date Notes   Appendectomy --  --    Bladder Surgery --  --    Cataract surgery --  --    Cholecystectomy --  --    Cystoscopy --  --    Eye Implant --  yes   Gallbladder --  --    Hysterectomy --  --    Samy-Jacque operation 06/22/2004 --    Thyroidectomy, Total --  --          Medication List  Name Date Started Instructions   amlodipine 5 mg oral tablet 03/24/2020 TAKE 1 TABLET ONCE DAILY   Bystolic 10 mg oral tablet 04/09/2020 TAKE 1 TABLET DAILY   candesartan 32 mg oral tablet 05/11/2020 TAKE 1 TABLET DAILY   cholecalciferol (vitamin D3) 5,000 unit oral capsule  take 1 capsule by oral route daily   Daily Multi-Vitamin oral tablet  take 1 tablet by oral route daily   Eliquis 5 mg oral tablet 05/14/2020 take 1 tablet (5 mg) by oral route 2 times per day   Ellura 200 mg oral capsule  take 1 capsule by oral route daily   estradiol 10 mcg vaginal tablet 08/03/2020 insert 1 tablet (10 mcg) by vaginal route twice weekly   flecainide 50 mg oral tablet  take 1 tablet (50 mg) by oral route every 12 hours   fluticasone propionate 50 mcg/actuation nasal spray,suspension 03/24/2020 spray 2 sprays (100 mcg) in each nostril by intranasal route once daily for 90 days   furosemide 40 mg oral  tablet 04/09/2020 TAKE 1 TABLET DAILY   gabapentin 300 mg oral capsule  Take 1 capsule BID and 2 HS   Keflex 500 mg oral capsule 08/03/2020 take 1 capsule (500 mg) by oral route every 12 hours for 7 days   lansoprazole 30 mg oral capsule,delayed release(DR/EC) 05/13/2020 TAKE 1 CAPSULE ONCE DAILY BEFORE A MEAL for 90 days   magnesium 200 mg oral tablet  take 2 tablets by oral route every 3 days   mupirocin 2 % topical ointment 04/14/2020 apply a small amount to the inside of nostril 3 times per day   Synthroid 137 mcg oral tablet 06/15/2020 take 1 tablet (137 mcg) by oral route once daily for 90 days   temazepam 15 mg oral capsule 07/05/2020 take 1 capsule (15 mg) by oral route once daily at bedtime as needed for 90 days   Tricor 145 mg oral tablet 05/13/2020 TAKE 1 TABLET ONCE DAILY for 90 days   Vesicare 5 mg oral tablet 05/06/2020 1 tablet po daily   Vision oral tablet  take 1 tablet by oral route 2 times a day   Zyrtec 10 mg oral tablet 03/24/2020 take 1 tablet (10 mg) by oral route once daily for 90 days         Allergy List  Allergen Name Date Reaction Notes   Bactrim DS --  affects breathing --    Cipro --  SOA --    doxycycline hyclate 10/6/2017 joint pain, stomach pain, weakness --    Macrobid 10/12/2017 stomach pain --    nitrofurantoin --  SOA --    oxycodone --  tachycardia --    PENICILLINS --  SOA --    Steroids --  affects head and heart --        Allergies Reconciled  Family Medical History  Disease Name Relative/Age Notes   Stroke Mother/   Mother   Heart Disease Mother/  Sister/   Mother; Sister   Cancer, Unspecified Brother/  Sister/   Sister; Brother   Diabetes, unspecified type Mother/   Mother  Mother; Sister   Colon Cancer  --    Renal Calculus Daughter/   --    Family history of certain chronic disabling diseases; arthritis Daughter/  Mother/  Sister/  Son/   Mother; Sister; Daughter; Son  Mother; Father; Sister; Brother; Daughter; Son   Osteoporosis Daughter/   Daughter         Social  "History  Finding Status Start/Stop Quantity Notes   Alcohol Never --/-- --  --    Claustophobic Unknown --/-- --  yes   lives alone --  --/-- --  --    Recreational Drug Use Never --/-- --  no   Retired --  --/-- --  --    Tobacco Former --/-- --  former smoker  former smoker  quit 50 years ago    --  --/-- --  6 children         Immunizations  NameDate Admin Mfg Trade Name Lot Number Route Inj VIS Given VIS Publication   Tyvrctpyg08/19/2019 NE Not Entered  NE NE 09/23/2019    Comments:    Pvpsvwnrs17/01/2018 SKB Fluzone Quadrivalent  NE NE 09/01/2018    Comments: date estimated   [id number] 05/14/2019 NE Not Entered  NE NE     Comments:    Prevnar 13Refused 05/14/2019 NE Not Entered  NE NE     Comments:          Review of Systems  · Constitutional  o Admits  o : fatigue, weakness  o Denies  o : sick contacts, fever, chills  · Cardiovascular  o Admits  o : palpitations  o Denies  o : dypnea on exertion, pain in chest  · Respiratory  o Denies  o : shortness of breath, cough  · Gastrointestinal  o Denies  o : diarrhea, constipation  · Genitourinary  o Denies  o : urgency, frequency      Vitals  Date Time BP Position Site L\R Cuff Size HR RR TEMP (F) WT  HT  BMI kg/m2 BSA m2 O2 Sat        08/06/2020 05:04 /60 Sitting    59 - R 18 97.7 138lbs 0oz 5'  4\" 23.69 1.68 95 %          Physical Examination  · Constitutional  o Appearance  o : well-nourished, well developed, alert, in no acute distress, well-tended appearance  · Head and Face  o Head  o :   § Inspection  § : atraumatic, normocephalic  · Eyes  o Eyes  o : extraocular movements intact, no scleral icterus, no conjunctival injection  · Respiratory  o Respiratory Effort  o : breathing comfortably, symmetric chest rise  o Auscultation of Lungs  o : clear to asculatation bilaterally, no wheezes, rales, or rhonchii  · Cardiovascular  o Heart  o :   § Auscultation of Heart  § : regular rate and rhythm, no murmurs, rubs, or gallops  o Peripheral " Vascular System  o :   § Extremities  § : no edema  · Gastrointestinal  o Abdominal Examination  o :   § Abdomen  § : bowel sounds present, non-distended, non-tender  · Skin and Subcutaneous Tissue  o General Inspection  o : no lesions present, no areas of discoloration, skin turgor normal  · Neurologic  o Mental Status Examination  o :   § Orientation  § : grossly oriented to person, place and time  o Gait and Station  o :   § Gait Screening  § : Ambulates with walker  · Psychiatric  o General  o : normal mood and affect          Assessment  · Visit for screening mammogram     V76.12/Z12.31  · CHF (congestive heart failure)     428.0/I50.9  · Fatigue     780.79/R53.83  · Insomnia, unspecified     780.52/G47.00  · Paroxysmal atrial fibrillation     427.31/I48.0  · Stage 3 chronic kidney disease     585.3/N18.3  · Weakness     780.79/R53.1    Problems Reconciled  Plan  · Orders  o Screening Mammography; Bilateral 3D (36781, 31323, ) - V76.12/Z12.31 - 08/06/2020   [facility]  o CARDIOLOGY CONSULTATION (CARDI) - 428.0/I50.9, 427.31/I48.0 - 08/06/2020   Would like to see Krishan Clark if we can forward all her information to him  o MARISA Report (KASPR) - 780.52/G47.00 - 08/06/2020  o IM/SQ - Injection Fee [facility] ([id number]) - 780.79/R53.1, 780.79/R53.83 - 08/06/2020  o ACO-39: Current medications updated and reviewed () - - 08/06/2020  o Vitamin B12 Injection () - 780.79/R53.1, 780.79/R53.83 - 08/06/2020   Injection - Vitamin B12; Dose: 1ml; Site: Left Deltoid; Route: intramuscular; Date: 08/06/2020 18:01:42; Exp: 12/01/2021; Lot: [id number]; Mfg: BioDerm; TradeName: cyanocobalamin (vitamin B-12); Location: [facility]; Administered By: Rebeca Whitaker CMA; Comment:   o PHYSICAL THERAPY CONSULTATION ([facility]) - 780.79/R53.1 - 08/06/2020   [facility]  · Medications  o Medications have been Reconciled  o Transition of Care or Provider Policy  · Instructions  o Avoid any electronic use for at least 30 minutes prior to bed time.  Cell phone screens, tablets and TVs imitate daylight, so your brain can become confused on the time of day. No caffeine use in the late afternoon and evenings.  o Patient is taking medications as prescribed and doing well.   o Patient was educated/instructed on their diagnosis, treatment and medications prior to discharge from the clinic today.  o Call the office with any concerns or questions.  o Chronic conditions reviewed and taken into consideration for today's treatment plan.  o Will send patient to PT to help work on her strength and weakness, will also give her a B12 shot to see if there is any improvement in symptoms.   · Disposition  o Follow Up in 3 months     I will see Malika back in 3 months or sooner if needed for evaluation and treatment.             Electronically Signed by: ALEXANDER Blake -Author on August 9, 2020 06:05:29 PM

## 2021-05-13 NOTE — PROGRESS NOTES
"   Progress Note      Patient Name: Malika Jha   Patient ID: [id number]   Sex: Female   YOB: 1940    Primary Care Provider: Noa MUNOZ   Referring Provider: Noa MUNOZ    Visit Date: October 29, 2020    Provider: ALEXANDER Dyer   Location: [facility]   Location Address: [address] [place] [address], [address]  [place], [place]  [id number]   Location Phone: [phone]          Chief Complaint  · \"Still hurting when I urinate\"  · UTI      History Of Present Illness  The patient returns for a routine follow-up for a UTI. She had burning on urination last night and this morning. Concerned she has uti again. Admits to having some problems with stool and hemorrhoid recently. She has not been using the estradiol vaginal tabs. Used up and did not realize had refills. Takes vesicare for oab and urethral spasms. Ellura continued. Denies fever or chills.       Past Medical History  Anemia; Anxiety; Arthritis; Atrial Fibrillation; Bladder Disorder; Cancer; Cataracts, bilateral; CKD (chronic kidney disease); Colon abnormality; Cystocele; Heart Disease; Hyperlipemia; Hypertension; Hypothyroidism; Insomnia; Leg cramps; Limb Swelling; Nocturia; Overactive bladder; Palpitations; Pancreatitis; Paroxysmal atrial fibrillation; Pyuria; Recurrent UTI (urinary tract infection); Reflux; Renal calculus or stone; Rosacea; Screening for colon cancer; Screening Mammogram; Seasonal allergies; Shortness of Breath; Stage 3 chronic kidney disease; Stomach Disorder; Stress Incontinence; Thyroid disorder         Past Surgical History  Appendectomy; Bladder Surgery; Cataract surgery; Cholecystectomy; Cystoscopy; Eye Implant; Gallbladder; Hysterectomy; Samy-Jacque operation; Thyroidectomy, Total         Medication List  amlodipine 5 mg oral tablet; Bystolic 10 mg oral tablet; candesartan 32 mg oral tablet; cholecalciferol (vitamin D3) 5,000 unit oral capsule; cranberry 500 mg oral capsule; Daily Multi-Vitamin " oral tablet; Eliquis 5 mg oral tablet; Ellura 200 mg oral capsule; estradiol 10 mcg vaginal tablet; flecainide 50 mg oral tablet; fluticasone propionate 50 mcg/actuation nasal spray,suspension; furosemide 40 mg oral tablet; gabapentin 300 mg oral capsule; lansoprazole 30 mg oral capsule,delayed release(DR/EC); magnesium 200 mg oral tablet; mupirocin 2 % topical ointment; senna 8.6 mg oral tablet; Synthroid 137 mcg oral tablet; temazepam 15 mg oral capsule; Tricor 145 mg oral tablet; Vesicare 5 mg oral tablet; Vision oral tablet; Zyrtec 10 mg oral tablet         Allergy List  Bactrim DS; Cipro; doxycycline hyclate; Macrobid; nitrofurantoin; oxycodone; PENICILLINS; Steroids         Family Medical History  Stroke; Heart Disease; Cancer, Unspecified; Diabetes, unspecified type; Colon Cancer; Renal Calculus; Family history of certain chronic disabling diseases; arthritis; Osteoporosis         Social History  Alcohol (Never); Claustophobic (Unknown); lives alone; Recreational Drug Use (Never); Retired; Tobacco (Former);          Immunizations  Name Date Admin   Influenza 09/19/2019   Influenza 09/01/2018   [id number] Refused   Prevnar 13 Refused         Review of Systems  · Constitutional  o Denies  o : fever, headache, chills  · Eyes  o Denies  o : eye pain, double vision, blurred vision  · HENT  o Denies  o : sinus problems, sore throat, ear infection  · Cardiovascular  o Denies  o : chest pain, high blood pressure, varicosities  · Respiratory  o Admits  o : shortness of breath  o Denies  o : wheezing, frequent cough  · Gastrointestinal  o Denies  o : nausea, vomiting, heartburn, indigestion,   · Genitourinary  o Admits  o : burning and urethral spasm better today  o Denies  o : urgency, frequency, urinary retention,  · Integument  o Denies  o : rash, itching, boils  · Neurologic  o Denies  o : tingling or numbness, tremors, dizzy spells  · Musculoskeletal  o Admits  o : joint pain, neck pain, shoulder  "pain  · Endocrine  o Denies  o : cold intolerance, heat intolerance, tired, excessive thirst, sluggish  · Psychiatric  o Admits  o : feels satisfied with life  o Denies  o : severe depression, concerns with hurting themselves  · Heme-Lymph  o Denies  o : swollen glands, blood clotting problems  · Allergic-Immunologic  o Denies  o : sinus allergy symptoms, hay fever      Vitals  Date Time BP Position Site L\R Cuff Size HR RR TEMP (F) WT  HT  BMI kg/m2 BSA m2 O2 Sat FR L/min FiO2        10/29/2020 11:33 /54 Sitting    58 - R  97.9 138lbs 0oz 5'  4\" 23.69 1.68             Physical Examination  · Constitutional  o Appearance  o : Well nourished, well developed patient in no acute distress. Ambulating without difficulty.  · Respiratory  o Respiratory Effort  o : breathing unlabored  o Inspection of Chest  o : normal appearance, no retractions  o Auscultation of Lungs  o : normal breath sounds throughout  · Cardiovascular  o Heart  o :   § Auscultation of Heart  § : regular rate and rhythm, no murmurs, gallops or rubs  · Gastrointestinal  o Abdominal Examination  o : generalized tenderness abdomen and right flank region to palpation with normal tone and without rigidity or guarding. Normal bowel sounds. No masses present.  o Liver and spleen  o : no abnormalities  o Hernias  o : absent   · Lymphatic  o Groin  o : No lymphadenopathy present  · Skin and Subcutaneous Tissue  o General Inspection  o : no lesions present, no areas of discoloration, skin turgor normal, texture normal  · Neurologic  o Mental Status Examination  o : grossly oriented to person, place and time  o Gait and Station  o : normal gait, able to stand without difficulty  · Psychiatric  o Mood and Affect  o : mood normal, affect appropriate      Figure 1.0: Pain Rating Scale-Evelia         Results  · In-Office Procedures  o Lab procedure  § Automated dipstick urinalysis with microscopy (73103)   § Color Ur: Yellow   § Clarity Ur: Clear   § Glucose " Ur Ql Strip: Negative   § Bilirub Ur Ql Strip: Negative   § Ketones Ur Ql Strip: Negative   § Sp Gr Ur Qn: 1.010   § Hgb Ur Ql Strip: Trace-Lysed   § pH Ur-LsCnc: 5.5   § Prot Ur Ql Strip: Negative   § Urobilinogen Ur Strip-mCnc: 0.2 E.U./dL   § Nitrite Ur Ql Strip: Negative   § WBC Est Ur Ql Strip: Large   § RBC UrnS Qn HPF: 0   § WBC UrnS Qn HPF: 0-1   § Bacteria UrnS Qn HPF: 0-1   § Crystals UrnS Qn HPF: 0   § Epithelial Cells (non renal): 0 /HPF  § Epithelial Cells (renal): 0       Assessment  · Urinary Tract Infection     599.0/N39.0  · Anxiety     300.02/F41.1  · Recurrent UTI     599.0/N39.0  · Right flank pain     789.09/R10.9  · History of renal calculi     V13.01/Z87.442      Plan  · Orders  o CT abdomen and pelvis; without contrast material (16910) - 599.0/N39.0, 789.09/R10.9, V13.01/Z87.442, 300.02/F41.1 - 10/29/2020   prefers at [facility]ladimir and morning time  o Urine culture (90913, 31699) - 599.0/N39.0, 789.09/R10.9, V13.01/Z87.442, 300.02/F41.1 - 10/29/2020  o Urine Culture (Clean Catch) [facility] ([id number]) - 599.0/N39.0 - 10/29/2020  · Instructions  o Repeat urine culture      right flank region and abdominal tenderness. last kub in office difficult to see due to excessive gas. will get ct abd/pelvis stone protocol to be sure no upper tract problems since recurrent infections. advised to fill the estradiol vag tab. send urine for culture and if tomorrow no results, will rx antibiotic. will have follow up after ct to discuss possible repeat cysto in office. she is fearful of anesthesia and does not want any procedures requiring sedation/anesthesia.             Electronically Signed by: ALEXANDER Dyer -Author on October 29, 2020 12:17:57 PM

## 2021-05-13 NOTE — PROGRESS NOTES
Progress Note      Patient Name: Malika Jha   Patient ID: [id number]   Sex: Female   YOB: 1940    Primary Care Provider: Noa MUNOZ   Referring Provider: Noa MUNOZ    Visit Date: July 7, 2020    Provider: Raji Prajapati MD   Location: [facility]   Location Address: [address] [place] [address], [address]  [id number]   Location Phone: [phone]          Chief Complaint  · Following up on uti       History Of Present Illness  The patient is a 80 year old /White female , who is here to follow up on uti and oab. She is on last day of zyvox and dysuria has resolved. Urine culture was positive for enterococcus. Patient does have history with jaime and cystourethrocele. Does not wasn't repair for fear of anesthesia.          6/26/20 urine culture       Past Medical History  Anemia; Anxiety; Arthritis; Atrial Fibrillation; Bladder Disorder; Cancer; Cataracts, bilateral; CKD (chronic kidney disease); Colon abnormality; Cystocele; Heart Disease; Hyperlipemia; Hypertension; Hypothyroidism; Insomnia; Leg cramps; Limb Swelling; Nocturia; Overactive bladder; Palpitations; Pancreatitis; Paroxysmal atrial fibrillation; Pyuria; Recurrent UTI (urinary tract infection); Reflux; Renal calculus or stone; Rosacea; Screening for colon cancer; Screening Mammogram; Seasonal allergies; Shortness of Breath; Stage 3 chronic kidney disease; Stomach Disorder; Stress Incontinence; Thyroid disorder         Past Surgical History  Appendectomy; Bladder Surgery; Cataract surgery; Cholecystectomy; Cystoscopy; Eye Implant; Gallbladder; Hysterectomy; Samy-Jacque operation; Thyroidectomy, Total         Medication List  amlodipine 5 mg oral tablet; Bystolic 10 mg oral tablet; candesartan 32 mg oral tablet; cholecalciferol (vitamin D3) 5,000 unit oral capsule; Daily Multi-Vitamin oral tablet; Eliquis 5 mg oral tablet; flecainide 50 mg oral tablet; fluticasone propionate 50  mcg/actuation nasal spray,suspension; furosemide 40 mg oral tablet; gabapentin 300 mg oral capsule; lansoprazole 30 mg oral capsule,delayed release(DR/EC); magnesium 200 mg oral tablet; mupirocin 2 % topical ointment; Synthroid 137 mcg oral tablet; temazepam 15 mg oral capsule; Tricor 145 mg oral tablet; Vesicare 5 mg oral tablet; Vision oral tablet; Zyrtec 10 mg oral tablet; Zyvox 600 mg oral tablet         Allergy List  Bactrim DS; Cipro; doxycycline hyclate; Macrobid; nitrofurantoin; oxycodone; PENICILLINS; Steroids         Family Medical History  Stroke; Heart Disease; Cancer, Unspecified; Diabetes, unspecified type; Colon Cancer; Renal Calculus; Family history of certain chronic disabling diseases; arthritis; Osteoporosis         Social History  Alcohol (Never); Claustophobic (Unknown); lives alone; Recreational Drug Use (Never); Retired; Tobacco (Former);          Immunizations  Name Date Admin   Influenza    Influenza    [id number] Refused   Prevnar 13 Refused         Review of Systems  · Constitutional  o Denies  o : fever, headache, chills  · Eyes  o Denies  o : eye pain, double vision, blurred vision  · HENT  o Denies  o : sinus problems, sore throat, ear infection  · Cardiovascular  o Denies  o : chest pain, high blood pressure, varicosities  · Respiratory  o Denies  o : shortness of breath, wheezing, frequent cough  · Gastrointestinal  o Admits  o : diarrhea, hemorrhoids  o Denies  o : nausea, vomiting, heartburn, indigestion, abdominal pain  · Genitourinary  o Denies  o : urgency, frequency, urinary retention, painful urination  · Integument  o Denies  o : rash, itching, boils  · Neurologic  o Denies  o : tingling or numbness, tremors, dizzy spells  · Musculoskeletal  o Denies  o : joint pain, neck pain, back pain  · Endocrine  o Denies  o : cold intolerance, heat intolerance, tired, excessive thirst, sluggish  · Psychiatric  o Admits  o : feels satisfied with life  o Denies  o : severe  "depression, concerns with hurting themselves  · Heme-Lymph  o Denies  o : swollen glands, blood clotting problems  · Allergic-Immunologic  o Denies  o : sinus allergy symptoms, hay fever      Vitals  Date Time BP Position Site L\R Cuff Size HR RR TEMP (F) WT  HT  BMI kg/m2 BSA m2 O2 Sat        07/07/2020 10:11 /63 Sitting    87 - R   143lbs 0oz 5'  4\" 24.55 1.71           Physical Examination  · Constitutional  o Appearance  o : Well nourished, well developed patient in no acute distress. Ambulating without difficulty.  · Respiratory  o Respiratory Effort  o : Breathing is unlabored without accessory muscle use  o Inspection of Chest  o : normal appearance, no retractions  o Auscultation of Lungs  o : normal breath sounds throughout  · Cardiovascular  o Heart  o :   § Auscultation of Heart  § : regular rate and rhythm, no murmurs, gallops or rubs  o Peripheral Vascular System  o : No abnormalities  · Gastrointestinal  o Abdominal Examination  o : Scaphoid abdomen which is non-tender to palpation with normal tone and without rigidity or guarding. Normal bowel sounds. No masses present.  · Lymphatic  o Groin  o : No lymphadenopathy present  · Skin and Subcutaneous Tissue  o General Inspection  o : No rashes, lesions or areas of discoloration present. Skin turgor is normal.  · Neurologic  o Mental Status Examination  o : grossly oriented to person, place and time  o Gait and Station  o : normal gait, able to stand without difficulty  · Psychiatric  o Mood and Affect  o : mood normal, affect appropriate      Figure 1.0: Pain Rating Scale-[place]         Results  · In-Office Procedures  o Lab procedure  § Automated dipstick urinalysis with microscopy ([id number])   § Color Ur: Yellow   § Clarity Ur: Clear   § Glucose Ur Ql Strip: Negative   § Bilirub Ur Ql Strip: Negative   § Ketones Ur Ql Strip: Negative   § Sp Gr Ur Qn: 1.025   § Hgb Ur Ql Strip: Negative   § pH Ur-LsCnc: 6.0   § Prot Ur Ql Strip: Negative "   § Urobilinogen Ur Strip-mCnc: 0.2 E.U./dL   § Nitrite Ur Ql Strip: Negative   § WBC Est Ur Ql Strip: Negative   § RBC UrnS Qn HPF: 0   § WBC UrnS Qn HPF: 0   § Bacteria UrnS Qn HPF: 0   § Crystals UrnS Qn HPF: 0   § Epithelial Cells (non renal): vag cell /HPF  § Epithelial Cells (renal): 0       Assessment  · Cystourethrocele     618.09/N81.10  · Overactive bladder     596.51/N32.81  · History of UTI     V13.02/Z87.440    Problems Reconciled  Plan  · Medications  o Medications have been Reconciled  o Transition of Care or Provider Policy     continue generic vesicare and follow up 6 months for routine visit. uti resolved.             Electronically Signed by: ALEXANDER Dyer -Author on July 7, 2020 10:48:25 AM

## 2021-05-13 NOTE — PROGRESS NOTES
Progress Note      Patient Name: Malika Jha   Patient ID: [id number]   Sex: Female   YOB: 1940    Primary Care Provider: Noa MUNOZ   Referring Provider: Noa MUNOZ    Visit Date: October 12, 2020    Provider: ALEXANDER Dyer   Location: [facility]   Location Address: [address] [place] [address], [address] [place] [address]  [id number]   Location Phone: [phone]          Chief Complaint  · Renal stone  · Cystocele  · UTI      History Of Present Illness  The patient presents for follow-up for follow up on recurrent uti. She is doing well. No symptoms of uti today. She is taking ellura, senna and estradiol vaginal tablet. New Cardiologist Dr Nickerson who comes from [place] to [facility]. Denies any dysuria or hematuria. No renal colic.       Review of Systems  · Constitutional  o Admits  o : headache  o Denies  o : fever, chills  · Eyes  o Denies  o : eye pain, double vision, blurred vision  · HENT  o Denies  o : sinus problems, sore throat, ear infection  · Cardiovascular  o Denies  o : chest pain, high blood pressure, varicosities  · Respiratory  o Admits  o : shortness of breath  o Denies  o : wheezing, frequent cough  · Gastrointestinal  o Denies  o : nausea, vomiting, heartburn, indigestion, abdominal pain  · Genitourinary  o Denies  o : urgency, frequency, urinary retention, painful urination  · Integument  o Denies  o : rash, itching, boils  · Neurologic  o Admits  o : dizzy spells  o Denies  o : tingling or numbness, tremors  · Musculoskeletal  o Denies  o : joint pain, neck pain, back pain  · Endocrine  o Denies  o : cold intolerance, heat intolerance, tired, excessive thirst, sluggish  · Psychiatric  o Admits  o : feels satisfied with life  o Denies  o : severe depression, concerns with hurting themselves  · Heme-Lymph  o Denies  o : swollen glands, blood clotting problems  · Allergic-Immunologic  o Denies  o : sinus allergy symptoms, hay fever      Vitals  Date Time  "BP Position Site L\R Cuff Size HR RR TEMP (F) WT  HT  BMI kg/m2 BSA m2 O2 Sat FR L/min FiO2 HC       10/12/2020 10:50 AM      60 - R   138lbs 0oz 5'  4\" 23.69 1.68       10/12/2020 11:21 /72 Sitting    55 - R                   Physical Examination  · Constitutional  o Appearance  o : Well nourished, well developed patient in no acute distress. Ambulating without difficulty.  · Respiratory  o Respiratory Effort  o : breathing unlabored  o Inspection of Chest  o : normal appearance, no retractions  o Auscultation of Lungs  o : normal breath sounds throughout  · Cardiovascular  o Heart  o :   § Auscultation of Heart  § : irregular rate and irregular rhythm, no murmurs, no gallops or rubs  o Peripheral Vascular System  o : No abnormalities  · Gastrointestinal  o Abdominal Examination  o : Scaphoid abdomen which is non-tender to palpation with normal tone and without rigidity or guarding. Normal bowel sounds. No masses present.  o Liver and spleen  o : no abnormalities  o Hernias  o : absent   · Lymphatic  o Groin  o : No lymphadenopathy present  · Skin and Subcutaneous Tissue  o General Inspection  o : No rashes, lesions or areas of discoloration present. Skin turgor is normal.  · Neurologic  o Mental Status Examination  o : grossly oriented to person, place and time  o Gait and Station  o : normal gait, able to stand without difficulty  · Psychiatric  o Mood and Affect  o : mood normal, affect appropriate      Figure 1.0: Pain Rating Scale-[place]         Results  · In-Office Procedures  o Lab procedure  § Automated dipstick urinalysis with microscopy (09109)   § Color Ur: Yellow   § Clarity Ur: Clear   § Glucose Ur Ql Strip: Negative   § Bilirub Ur Ql Strip: Negative   § Ketones Ur Ql Strip: Negative   § Sp Gr Ur Qn: 1.025   § Hgb Ur Ql Strip: Negative   § pH Ur-LsCnc: 7.0   § Prot Ur Ql Strip: Negative   § Urobilinogen Ur Strip-mCnc: 0.2 E.U./dL   § Nitrite Ur Ql Strip: Negative   § WBC Est Ur Ql Strip: Negative "   § RBC UrnS Qn HPF: 0   § WBC UrnS Qn HPF: 0   § Bacteria UrnS Qn HPF: 0   § Crystals UrnS Qn HPF: 0   § Epithelial Cells (non renal): 0 /HPF  § Epithelial Cells (renal): 0       Assessment  · Recurrent UTI     599.0/N39.0  · Rectocele     618.04/N81.6  · History of kidney stones     V13.01/Z87.442      Plan     advised to continue senna which helps with stool evacuation especially with her rectocele.   She has been free from uti and doing well on ellura, estradiol vaginal tab. patient does not have infection today. follow up routine 6 months and prn.             Electronically Signed by: ALEXANDER Dyer -Author on October 12, 2020 11:29:35 AM

## 2021-05-13 NOTE — PROGRESS NOTES
Progress Note      Patient Name: Malika Jha   Patient ID: [id number]   Sex: Female   YOB: 1940    Primary Care Provider: Noa MUNOZ   Referring Provider: Noa MUNOZ    Visit Date: July 17, 2020    Provider: Silvio Servin MD   Location: [facility]   Location Address: [address] [place] [address], [facility]   [place]  [id number]   Location Phone: [phone]          Chief Complaint     Palpitations.  Pedal edema.       History Of Present Illness  Malika Jha is an 80 year old /White female with paroxysmal atrial fibrillation and hypertension who recently had a UTI. She feels tired and weak. Still has palpitations off and on. Complains of pedal edema.   CURRENT MEDICATIONS: Amlodipine 5 mg daily; Bystolic 10 mg daily; Eliquis 5 mg b.i.d.; Flecainide 50 mg b.i.d.; candesartan 32 mg daily; furosemide 40 mg 1.5 tablets daily; Tricor 145 mcg daily; lansoprazole 30 mg daily; temazepam 15 mg daily; Synthroid 137 mcg daily; gabapentin 600 mg q.i.d.; ellura 36 mg PAC daily; solifenacin succinate 5 mg daily; magnesium 400 mg 2x week; Vesicare 5 mg daily; vitamin D daily; multivitamin daily.   PAST MEDICAL HISTORY: Anemia; Anxiety; Arthritis; Atrial Fibrillation; Bladder Disorder; Cancer; Chronic kidney disease; Hyperlipemia; Hypertension; Hypothyroidism; Insomnia; Leg cramps; Limb Swelling; Palpitations; Pancreatitis; Paroxysmal atrial fibrillation; Reflux; Renal calculus or stone; Rosacea; Seasonal allergies.   FAMILY HISTORY: Positive for diabetes mellitus and hypertension. Negative for heart disease.   PSYCHOSOCIAL HISTORY: Denies alcohol or tobacco use. Denies mood changes or depression.       Review of Systems  · Cardiovascular  o Admits  o : palpitations (fast, fluttering, or skipping beats), swelling (feet, ankles, hands), shortness of breath while walking or lying flat, chest pain or angina pectoris   · Respiratory  o Denies  o : chronic or  "frequent cough, asthma or wheezing      Vitals  Date Time BP Position Site L\R Cuff Size HR RR TEMP (F) WT  HT  BMI kg/m2 BSA m2 O2 Sat HC       07/17/2020 11:02 /62 Sitting    56 - R   138lbs 0oz 5'  4\" 23.69 1.68     07/17/2020 11:02 /66 Sitting                     Physical Examination  · Constitutional  o Appearance  o : Awake, alert, cooperative, pleasant.  · Respiratory  o Inspection of Chest  o : No chest wall deformities, moving equal.  o Auscultation of Lungs  o : Good air entry with vesicular breath sounds.  · Cardiovascular  o Heart  o :   § Auscultation of Heart  § : S1 and S2 regular. No S3. No S4.   o Peripheral Vascular System  o :   § Extremities  § : Peripheral pulses were well felt. No edema. No cyanosis.  · Gastrointestinal  o Abdominal Examination  o : No masses or organomegaly noted.          Assessment     ASSESSMENT & PLAN:    1.  Paroxysmal atrial fibrillation.  Continue Eliquis for stroke prevention.  Continue Flecainide for rhythm        control.    2.  Chronic diastolic heart failure.  Continue Lasix which has been increased recently.  Check her BNP and        BMP.    3.  Urinary tract infection.  Follow up with her PMD.  4.  See me back in 6 months.      Silvio Servin MD, FACC  [place]:vm             Electronically Signed by: Kika Flaherty-, Other -Author on July 22, 2020 06:58:58 AM  Electronically Co-signed by: Silvio Servin MD -Reviewer on July 24, 2020 08:38:28 AM  "

## 2021-05-13 NOTE — PROGRESS NOTES
"   Progress Note      Patient Name: Malika Jha   Patient ID: [id number]   Sex: Female   YOB: 1940    Primary Care Provider: Noa MUNOZ   Referring Provider: Noa MUNOZ    Visit Date: July 30, 2020    Provider: ALEXANDER Dyer   Location: [facility]   Location Address: [address] [place] [address], [address]  [address]   Location Phone: [phone]          Chief Complaint  · \"Hurting when I urinate\"      History Of Present Illness  The patient is a 80 year old /White female , who is here for evaluation of possible uti again. Began having some intermittent dysuria few days ago. Denies fever or chills. She is taking vesicare and cranberry pills. She was treated to zyvox for last uti and urine was clear after treatment.          6/26/20 urine culture       Past Medical History  Anemia; Anxiety; Arthritis; Atrial Fibrillation; Bladder Disorder; Cancer; Cataracts, bilateral; CKD (chronic kidney disease); Colon abnormality; Cystocele; Heart Disease; Hyperlipemia; Hypertension; Hypothyroidism; Insomnia; Leg cramps; Limb Swelling; Nocturia; Overactive bladder; Palpitations; Pancreatitis; Paroxysmal atrial fibrillation; Pyuria; Recurrent UTI (urinary tract infection); Reflux; Renal calculus or stone; Rosacea; Screening for colon cancer; Screening Mammogram; Seasonal allergies; Shortness of Breath; Stage 3 chronic kidney disease; Stomach Disorder; Stress Incontinence; Thyroid disorder         Past Surgical History  Appendectomy; Bladder Surgery; Cataract surgery; Cholecystectomy; Cystoscopy; Eye Implant; Gallbladder; Hysterectomy; Samy-Jacque operation; Thyroidectomy, Total         Medication List  amlodipine 5 mg oral tablet; Bystolic 10 mg oral tablet; candesartan 32 mg oral tablet; cholecalciferol (vitamin D3) 5,000 unit oral capsule; Daily Multi-Vitamin oral tablet; Eliquis 5 mg oral tablet; Ellura 200 mg oral capsule; estradiol 10 mcg vaginal " tablet; flecainide 50 mg oral tablet; fluticasone propionate 50 mcg/actuation nasal spray,suspension; furosemide 40 mg oral tablet; gabapentin 300 mg oral capsule; lansoprazole 30 mg oral capsule,delayed release(DR/EC); magnesium 200 mg oral tablet; mupirocin 2 % topical ointment; senna 8.6 mg oral tablet; Synthroid 137 mcg oral tablet; temazepam 15 mg oral capsule; Tricor 145 mg oral tablet; Vesicare 5 mg oral tablet; Vision oral tablet; Zyrtec 10 mg oral tablet         Allergy List  Bactrim DS; Cipro; doxycycline hyclate; Macrobid; nitrofurantoin; oxycodone; PENICILLINS; Steroids         Family Medical History  Stroke; Heart Disease; Cancer, Unspecified; Diabetes, unspecified type; Colon Cancer; Renal Calculus; Family history of certain chronic disabling diseases; arthritis; Osteoporosis         Social History  Alcohol (Never); Claustophobic (Unknown); lives alone; Recreational Drug Use (Never); Retired; Tobacco (Former);          Immunizations  Name Date Admin   Influenza    Influenza    [id number] Refused   Prevnar 13 Refused         Review of Systems  · Constitutional  o Admits  o : fatigue  o Denies  o : fever, headache, chills  · Eyes  o Denies  o : eye pain, double vision, blurred vision  · HENT  o Denies  o : sinus problems, sore throat, ear infection  · Cardiovascular  o Denies  o : chest pain, high blood pressure, varicosities  · Respiratory  o Denies  o : shortness of breath, wheezing, frequent cough  · Gastrointestinal  o Denies  o : nausea, vomiting, heartburn, indigestion, abdominal pain  · Genitourinary  o Admits  o : incontinence, frequency, painful urination  o Denies  o : urinary retention  · Integument  o Denies  o : rash, itching, boils  · Neurologic  o Denies  o : tingling or numbness, tremors, dizzy spells  · Musculoskeletal  o Denies  o : joint pain, neck pain, back pain  · Endocrine  o Denies  o : cold intolerance, heat intolerance, tired, excessive thirst,  "sluggish  · Psychiatric  o Admits  o : feels satisfied with life  o Denies  o : severe depression, concerns with hurting themselves  · Heme-Lymph  o Denies  o : swollen glands, blood clotting problems  · Allergic-Immunologic  o Denies  o : sinus allergy symptoms, hay fever      Vitals  Date Time BP Position Site L\R Cuff Size HR RR TEMP (F) WT  HT  BMI kg/m2 BSA m2 O2 Sat        07/30/2020 11:34 /61 Sitting    55 - R   138lbs 0oz 5'  4\" 23.69 1.68           Physical Examination  · Constitutional  o Appearance  o : Well nourished, well developed patient in no acute distress. Ambulating without difficulty.  · Respiratory  o Respiratory Effort  o : Breathing is unlabored without accessory muscle use  o Inspection of Chest  o : normal appearance, no retractions  o Auscultation of Lungs  o : normal breath sounds throughout  · Cardiovascular  o Heart  o :   § Auscultation of Heart  § : regular rate and rhythm, no murmurs, gallops or rubs  o Peripheral Vascular System  o : No abnormalities  · Gastrointestinal  o Abdominal Examination  o : Scaphoid abdomen which is non-tender to palpation with normal tone and without rigidity or guarding. Normal bowel sounds. No masses present.  o Liver and spleen  o : No hepatomegaly present. Liver is non-tender to palpation and spleen is not palpable.  o Hernias  o : no hernias present  · Lymphatic  o Groin  o : No lymphadenopathy present  · Skin and Subcutaneous Tissue  o General Inspection  o : No rashes, lesions or areas of discoloration present. Skin turgor is normal.  · Neurologic  o Mental Status Examination  o : grossly oriented to person, place and time  o Gait and Station  o : normal gait, able to stand without difficulty  · Psychiatric  o Mood and Affect  o : mood normal, affect appropriate     she has cystourethrocele grade 3 and weaking of pelvic floor. mild urethral prolapse.       Figure 1.0: Pain Rating Scale-[place]         Results  · In-Office Procedures  o Lab " procedure  § Automated dipstick urinalysis with microscopy (71428)   § Color Ur: Yellow   § Clarity Ur: Cloudy   § Glucose Ur Ql Strip: Negative   § Bilirub Ur Ql Strip: Negative   § Ketones Ur Ql Strip: Negative   § Sp Gr Ur Qn: 1.020   § Hgb Ur Ql Strip: Trace-Intact   § pH Ur-LsCnc: 8.5   § Prot Ur Ql Strip: 100 mg/dL   § Urobilinogen Ur Strip-mCnc: 0.2 E.U./dL   § Nitrite Ur Ql Strip: Positive   § WBC Est Ur Ql Strip: Large   § RBC UrnS Qn HPF: 0   § WBC UrnS Qn HPF: 5-6   § Bacteria UrnS Qn HPF: 4+   § Crystals UrnS Qn HPF: 0   § Epithelial Cells (renal): 0       Assessment  · Atrophic Vaginitis     627.3/N95.2  · Urinary Incontinence     788.30/R32  · Bladder prolapse, female, acquired     618.01/N81.10  · Recurrent UTI     599.0/N39.0    Problems Reconciled  Plan  · Orders  o Urine culture ([id number], 65923) - 618.01/N81.10, 788.30/R32, 627.3/N95.2, 599.0/N39.0 - 07/30/2020  · Medications  o Medications have been Reconciled  o Transition of Care or Provider Policy     try on estradiol vaginal tab 2 times a week but need to check on past gyn history first send urine for culture then treat appropriate. her urethra seems mildly obstructed due to position from the prolapse. patient does not want another bladder repair. usr check approximately 30cc post void. advised to consume water throughout the day. May continue vesicare for now , it does help with her frequency. will do kub at follow up 10 days and consider ct if continues to have recurrent infections.             Electronically Signed by: ALEXANDER Dyer -Author on August 28, 2020 02:36:34 PM

## 2021-05-13 NOTE — PROGRESS NOTES
Progress Note      Patient Name: Malika Jha   Patient ID: [id number]   Sex: Female   YOB: 1940    Primary Care Provider: Noa MUNOZ   Referring Provider: Noa MUNOZ    Visit Date: August 10, 2020    Provider: ALEXANDER Dyer   Location: [facility]   Location Address: [address] [place] [address], [address]  [place], [place]  [id number]   Location Phone: [phone]          Chief Complaint  · follow up      History Of Present Illness  The patient is a 80 year old /White female , who is here to follow upon recurrent uti. She finished Keflex and denies any dysuria or gross hematuria. She has had a few recurrent uti past few months. Admits to problems with her bowels at times constipated however antibiotics makes her stools soft. Still has problems with evacuating her bowel when questioned. She does have problems with cystourethrocele and does not want surgical intervention fear of anesthesia. Urinary incontinence present.          urine culture 7/30/20       Past Medical History  Anemia; Anxiety; Arthritis; Atrial Fibrillation; Bladder Disorder; Cancer; Cataracts, bilateral; CKD (chronic kidney disease); Colon abnormality; Cystocele; Heart Disease; Hyperlipemia; Hypertension; Hypothyroidism; Insomnia; Leg cramps; Limb Swelling; Nocturia; Overactive bladder; Palpitations; Pancreatitis; Paroxysmal atrial fibrillation; Pyuria; Recurrent UTI (urinary tract infection); Reflux; Renal calculus or stone; Rosacea; Screening for colon cancer; Screening Mammogram; Seasonal allergies; Shortness of Breath; Stage 3 chronic kidney disease; Stomach Disorder; Stress Incontinence; Thyroid disorder         Past Surgical History  Appendectomy; Bladder Surgery; Cataract surgery; Cholecystectomy; Cystoscopy; Eye Implant; Gallbladder; Hysterectomy; Samy-Jacque operation; Thyroidectomy, Total         Medication List  amlodipine 5 mg oral tablet; Bystolic 10 mg oral tablet; candesartan  32 mg oral tablet; cholecalciferol (vitamin D3) 5,000 unit oral capsule; Daily Multi-Vitamin oral tablet; Eliquis 5 mg oral tablet; Ellura 200 mg oral capsule; estradiol 10 mcg vaginal tablet; flecainide 50 mg oral tablet; fluticasone propionate 50 mcg/actuation nasal spray,suspension; furosemide 40 mg oral tablet; gabapentin 300 mg oral capsule; lansoprazole 30 mg oral capsule,delayed release(DR/EC); magnesium 200 mg oral tablet; mupirocin 2 % topical ointment; senna 8.6 mg oral tablet; Synthroid 137 mcg oral tablet; temazepam 15 mg oral capsule; Tricor 145 mg oral tablet; Vesicare 5 mg oral tablet; Vision oral tablet; Zyrtec 10 mg oral tablet         Allergy List  Bactrim DS; Cipro; doxycycline hyclate; Macrobid; nitrofurantoin; oxycodone; PENICILLINS; Steroids         Family Medical History  Stroke; Heart Disease; Cancer, Unspecified; Diabetes, unspecified type; Colon Cancer; Renal Calculus; Family history of certain chronic disabling diseases; arthritis; Osteoporosis         Social History  Alcohol (Never); Claustophobic (Unknown); lives alone; Recreational Drug Use (Never); Retired; Tobacco (Former);          Immunizations  Name Date Admin   Influenza    Influenza    [id number] Refused   Prevnar 13 Refused         Review of Systems  · Constitutional  o Denies  o : fever, headache, chills  · HENT  o Admits  o : sinus problems  o Denies  o : sore throat, ear infection  · Cardiovascular  o Denies  o : chest pain, high blood pressure, varicosities  · Respiratory  o Admits  o : shortness of breath  o Denies  o : wheezing, frequent cough  · Gastrointestinal  o Denies  o : nausea, vomiting, heartburn, indigestion, abdominal pain  · Genitourinary  o Denies  o : urgency, frequency, urinary retention, painful urination  · Integument  o Denies  o : rash, itching, boils  · Neurologic  o Denies  o : tingling or numbness, tremors, dizzy spells  · Musculoskeletal  o Admits  o : joint pain  o Denies  o : neck pain,  "back pain  · Endocrine  o Denies  o : cold intolerance, heat intolerance, tired, excessive thirst, sluggish  · Psychiatric  o Admits  o : feels satisfied with life  o Denies  o : severe depression, concerns with hurting themselves  · Heme-Lymph  o Denies  o : swollen glands, blood clotting problems  · Allergic-Immunologic  o Denies  o : sinus allergy symptoms, hay fever      Vitals  Date Time BP Position Site L\R Cuff Size HR RR TEMP (F) WT  HT  BMI kg/m2 BSA m2 O2 Sat        08/10/2020 10:04 /48 Sitting    55 - R  96.9 138lbs 0oz 5'  4\" 23.69 1.68           Physical Examination  · Constitutional  o Appearance  o : Well nourished, well developed patient in no acute distress. Ambulating without difficulty.  · Respiratory  o Respiratory Effort  o : Breathing is unlabored without accessory muscle use  o Inspection of Chest  o : normal appearance, no retractions  o Auscultation of Lungs  o : normal breath sounds throughout  · Gastrointestinal  o Abdominal Examination  o : Scaphoid abdomen which is non-tender to palpation with normal tone and without rigidity or guarding. Normal bowel sounds. No masses present.  o Liver and spleen  o : No hepatomegaly present. Liver is non-tender to palpation and spleen is not palpable.  · Lymphatic  o Groin  o : No lymphadenopathy present  · Skin and Subcutaneous Tissue  o General Inspection  o : No rashes, lesions or areas of discoloration present. Skin turgor is normal.  · Neurologic  o Mental Status Examination  o : grossly oriented to person, place and time  o Gait and Station  o : normal gait, able to stand without difficulty  · Psychiatric  o Mood and Affect  o : mood normal, affect appropriate      Figure 1.0: Pain Rating Scale-Evelia         Results  · In-Office Procedures  o Lab procedure  § Automated dipstick urinalysis with microscopy (87406)   § Color Ur: Yellow   § Clarity Ur: Clear   § Glucose Ur Ql Strip: Negative   § Bilirub Ur Ql Strip: Negative   § Ketones Ur Ql " Strip: Negative   § Sp Gr Ur Qn: 1.020   § Hgb Ur Ql Strip: Negative   § pH Ur-LsCnc: 6.0   § Prot Ur Ql Strip: Negative   § Urobilinogen Ur Strip-mCnc: 0.2 E.U./dL   § Nitrite Ur Ql Strip: Negative   § WBC Est Ur Ql Strip: Negative   § RBC UrnS Qn HPF: 0   § WBC UrnS Qn HPF: 0   § Bacteria UrnS Qn HPF: 0   § Crystals UrnS Qn HPF: 0   § Epithelial Cells (non renal): 0 /HPF  § Epithelial Cells (renal): 0       Assessment  · History of renal calculi     V13.01/Z87.442  · Recurrent UTI     599.0/N39.0  · Constipation by delayed colonic transit     564.01/K59.01  · Urethral prolapse     599.5/N36.8    Problems Reconciled  Plan  · Medications  o Medications have been Reconciled  o Transition of Care or Provider Policy     will resend urine just to be sure no growth. kub in office. no evidence of visable stone. Large amount of flatus and stool. patient has cystourethrocele and mild urethral prolapse previous visit. does not want surgical intervention for prolapse. advise to use Premarin cream, ellura, and maintain bowel regularity. senna for constipation may hold if loose stools. vesicare may continue since helps with frequency.             Electronically Signed by: ALEXANDER Dyer -Author on August 10, 2020 12:01:08 PM

## 2021-05-14 VITALS
BODY MASS INDEX: 23.56 KG/M2 | HEART RATE: 55 BPM | HEART RATE: 60 BPM | WEIGHT: 138 LBS | HEIGHT: 64 IN | DIASTOLIC BLOOD PRESSURE: 72 MMHG | SYSTOLIC BLOOD PRESSURE: 154 MMHG

## 2021-05-14 VITALS
HEIGHT: 64 IN | DIASTOLIC BLOOD PRESSURE: 54 MMHG | SYSTOLIC BLOOD PRESSURE: 152 MMHG | HEART RATE: 58 BPM | WEIGHT: 138 LBS | BODY MASS INDEX: 23.56 KG/M2 | TEMPERATURE: 97.9 F

## 2021-05-15 VITALS
HEART RATE: 53 BPM | SYSTOLIC BLOOD PRESSURE: 168 MMHG | TEMPERATURE: 97.2 F | BODY MASS INDEX: 23.9 KG/M2 | DIASTOLIC BLOOD PRESSURE: 80 MMHG | HEIGHT: 64 IN | WEIGHT: 140 LBS | RESPIRATION RATE: 16 BRPM | OXYGEN SATURATION: 97 %

## 2021-05-15 VITALS
HEART RATE: 54 BPM | HEIGHT: 64 IN | WEIGHT: 141 LBS | SYSTOLIC BLOOD PRESSURE: 156 MMHG | BODY MASS INDEX: 24.07 KG/M2 | DIASTOLIC BLOOD PRESSURE: 58 MMHG

## 2021-05-15 VITALS
OXYGEN SATURATION: 95 % | DIASTOLIC BLOOD PRESSURE: 60 MMHG | WEIGHT: 138 LBS | RESPIRATION RATE: 18 BRPM | SYSTOLIC BLOOD PRESSURE: 124 MMHG | HEIGHT: 64 IN | HEART RATE: 59 BPM | TEMPERATURE: 97.7 F | BODY MASS INDEX: 23.56 KG/M2

## 2021-05-15 VITALS
DIASTOLIC BLOOD PRESSURE: 48 MMHG | TEMPERATURE: 96.9 F | BODY MASS INDEX: 23.56 KG/M2 | SYSTOLIC BLOOD PRESSURE: 144 MMHG | HEIGHT: 64 IN | WEIGHT: 138 LBS | HEART RATE: 55 BPM

## 2021-05-15 VITALS
DIASTOLIC BLOOD PRESSURE: 53 MMHG | TEMPERATURE: 98.4 F | BODY MASS INDEX: 25.27 KG/M2 | HEIGHT: 64 IN | HEART RATE: 55 BPM | SYSTOLIC BLOOD PRESSURE: 154 MMHG | WEIGHT: 148 LBS

## 2021-05-15 VITALS
HEIGHT: 64 IN | SYSTOLIC BLOOD PRESSURE: 164 MMHG | DIASTOLIC BLOOD PRESSURE: 60 MMHG | BODY MASS INDEX: 23.39 KG/M2 | WEIGHT: 137 LBS | HEART RATE: 52 BPM

## 2021-05-15 VITALS — OXYGEN SATURATION: 94 % | BODY MASS INDEX: 23.9 KG/M2 | HEIGHT: 64 IN | HEART RATE: 52 BPM | WEIGHT: 140 LBS

## 2021-05-15 VITALS
HEART RATE: 55 BPM | SYSTOLIC BLOOD PRESSURE: 132 MMHG | DIASTOLIC BLOOD PRESSURE: 61 MMHG | WEIGHT: 138 LBS | HEIGHT: 64 IN | BODY MASS INDEX: 23.56 KG/M2

## 2021-05-15 VITALS
DIASTOLIC BLOOD PRESSURE: 82 MMHG | HEIGHT: 64 IN | BODY MASS INDEX: 23.6 KG/M2 | WEIGHT: 138.25 LBS | SYSTOLIC BLOOD PRESSURE: 140 MMHG | TEMPERATURE: 97.4 F | RESPIRATION RATE: 18 BRPM | HEART RATE: 52 BPM | OXYGEN SATURATION: 98 %

## 2021-05-15 VITALS
TEMPERATURE: 97.8 F | WEIGHT: 140 LBS | RESPIRATION RATE: 16 BRPM | HEIGHT: 64 IN | DIASTOLIC BLOOD PRESSURE: 60 MMHG | BODY MASS INDEX: 23.9 KG/M2 | HEART RATE: 60 BPM | OXYGEN SATURATION: 95 % | SYSTOLIC BLOOD PRESSURE: 140 MMHG

## 2021-05-15 VITALS
HEIGHT: 64 IN | SYSTOLIC BLOOD PRESSURE: 166 MMHG | BODY MASS INDEX: 24.07 KG/M2 | WEIGHT: 141 LBS | HEART RATE: 58 BPM | DIASTOLIC BLOOD PRESSURE: 98 MMHG

## 2021-05-15 VITALS
TEMPERATURE: 97.6 F | HEIGHT: 64 IN | OXYGEN SATURATION: 98 % | BODY MASS INDEX: 24.41 KG/M2 | DIASTOLIC BLOOD PRESSURE: 70 MMHG | HEART RATE: 60 BPM | SYSTOLIC BLOOD PRESSURE: 144 MMHG | WEIGHT: 143 LBS | RESPIRATION RATE: 16 BRPM

## 2021-05-15 VITALS
HEIGHT: 64 IN | WEIGHT: 138.25 LBS | SYSTOLIC BLOOD PRESSURE: 170 MMHG | OXYGEN SATURATION: 94 % | DIASTOLIC BLOOD PRESSURE: 70 MMHG | RESPIRATION RATE: 18 BRPM | BODY MASS INDEX: 23.6 KG/M2 | TEMPERATURE: 97.5 F | HEART RATE: 52 BPM

## 2021-05-15 VITALS
BODY MASS INDEX: 23.22 KG/M2 | DIASTOLIC BLOOD PRESSURE: 56 MMHG | TEMPERATURE: 97.7 F | HEART RATE: 56 BPM | SYSTOLIC BLOOD PRESSURE: 154 MMHG | HEIGHT: 64 IN | WEIGHT: 136 LBS

## 2021-05-15 VITALS
SYSTOLIC BLOOD PRESSURE: 161 MMHG | HEART RATE: 87 BPM | WEIGHT: 143 LBS | HEIGHT: 64 IN | BODY MASS INDEX: 24.41 KG/M2 | DIASTOLIC BLOOD PRESSURE: 63 MMHG

## 2021-05-15 VITALS
TEMPERATURE: 96.9 F | HEIGHT: 64 IN | SYSTOLIC BLOOD PRESSURE: 142 MMHG | BODY MASS INDEX: 23.6 KG/M2 | WEIGHT: 138.25 LBS | OXYGEN SATURATION: 97 % | HEART RATE: 58 BPM | DIASTOLIC BLOOD PRESSURE: 78 MMHG | RESPIRATION RATE: 16 BRPM

## 2021-05-15 VITALS — OXYGEN SATURATION: 98 % | HEIGHT: 64 IN | BODY MASS INDEX: 24.5 KG/M2 | WEIGHT: 143.5 LBS | HEART RATE: 56 BPM

## 2021-05-15 VITALS — SYSTOLIC BLOOD PRESSURE: 166 MMHG | HEART RATE: 50 BPM | DIASTOLIC BLOOD PRESSURE: 63 MMHG | TEMPERATURE: 97.6 F

## 2021-05-15 VITALS
SYSTOLIC BLOOD PRESSURE: 160 MMHG | BODY MASS INDEX: 23.56 KG/M2 | WEIGHT: 138 LBS | DIASTOLIC BLOOD PRESSURE: 62 MMHG | HEIGHT: 64 IN | HEART RATE: 56 BPM

## 2021-05-15 VITALS
HEIGHT: 64 IN | BODY MASS INDEX: 24.41 KG/M2 | DIASTOLIC BLOOD PRESSURE: 55 MMHG | HEART RATE: 56 BPM | SYSTOLIC BLOOD PRESSURE: 134 MMHG | TEMPERATURE: 97.3 F | WEIGHT: 143 LBS

## 2021-05-16 VITALS
WEIGHT: 140 LBS | DIASTOLIC BLOOD PRESSURE: 56 MMHG | HEIGHT: 64 IN | SYSTOLIC BLOOD PRESSURE: 151 MMHG | TEMPERATURE: 97.6 F | BODY MASS INDEX: 23.9 KG/M2 | HEART RATE: 56 BPM

## 2021-05-16 VITALS
HEIGHT: 64 IN | TEMPERATURE: 97.3 F | WEIGHT: 140 LBS | BODY MASS INDEX: 23.9 KG/M2 | SYSTOLIC BLOOD PRESSURE: 131 MMHG | DIASTOLIC BLOOD PRESSURE: 49 MMHG | HEART RATE: 61 BPM

## 2021-05-16 VITALS
OXYGEN SATURATION: 98 % | HEART RATE: 56 BPM | HEIGHT: 64 IN | DIASTOLIC BLOOD PRESSURE: 72 MMHG | SYSTOLIC BLOOD PRESSURE: 132 MMHG | BODY MASS INDEX: 23.22 KG/M2 | WEIGHT: 136 LBS | RESPIRATION RATE: 18 BRPM | TEMPERATURE: 97.6 F

## 2021-05-16 VITALS
WEIGHT: 141 LBS | HEART RATE: 56 BPM | SYSTOLIC BLOOD PRESSURE: 154 MMHG | BODY MASS INDEX: 24.07 KG/M2 | HEIGHT: 64 IN | DIASTOLIC BLOOD PRESSURE: 72 MMHG

## 2021-05-16 VITALS — OXYGEN SATURATION: 98 % | WEIGHT: 138 LBS | HEIGHT: 64 IN | HEART RATE: 66 BPM | BODY MASS INDEX: 23.56 KG/M2

## 2021-05-16 VITALS
HEIGHT: 64 IN | BODY MASS INDEX: 23.22 KG/M2 | WEIGHT: 136 LBS | HEART RATE: 58 BPM | DIASTOLIC BLOOD PRESSURE: 72 MMHG | SYSTOLIC BLOOD PRESSURE: 146 MMHG

## 2021-05-16 VITALS — WEIGHT: 138 LBS | BODY MASS INDEX: 23.56 KG/M2 | HEIGHT: 64 IN | HEART RATE: 51 BPM | OXYGEN SATURATION: 97 %

## 2021-05-16 VITALS
HEART RATE: 54 BPM | TEMPERATURE: 97.6 F | SYSTOLIC BLOOD PRESSURE: 152 MMHG | HEIGHT: 64 IN | BODY MASS INDEX: 23.56 KG/M2 | WEIGHT: 138 LBS | DIASTOLIC BLOOD PRESSURE: 58 MMHG

## 2022-02-12 NOTE — PROCEDURES
Procedure Note      Patient Name: Malika Jha   Patient ID: [id number]   Sex: Female   YOB: 1940    Primary Care Provider: Noa MUNOZ   Referring Provider: Noa MUNOZ    Visit Date: August 10, 2020    Provider: ALEXANDER Dyer   Location: [facility]   Location Address: [address] [place] [address], [address]  [id number]   Location Phone: [phone]          The patient is a 80 year old /White female presents today for a KUB   Clinical History : renal stone   Technique: KUB   Findings: no obvious stones seen, lots of gas overlying the abdomen which can obscure any stones, surgical clips right upper quadrant, anchors in pubic bone, surgical clips in the pelvis, arthritis in the lumbar spine   Impression: as above           Assessment  · Renal Calculus     592.0/N20.0  · UTI (urinary tract infection)     599.0/N39.0    Problems Reconciled  Plan  · Orders  o KUB xray [facility] ([id number]) - 592.0/N20.0 - 08/10/2020  o Urine Culture (Clean Catch) [facility] ([id number]) - 592.0/N20.0, 599.0/N39.0 - 08/10/2020  · Medications  o Medications have been Reconciled  o Transition of Care or Provider Policy  · Instructions  o TIME OUT PROCEDURE: Correct patient and birth date; Correct procedure; Correct Physician; Consent signed            Electronically Signed by: KECIA Olvera -Author on August 28, 2020 08:20:26 AM  Electronically Co-signed by: Raji Prajapati MD -Reviewer on August 28, 2020 05:11:17 PM   CVA (cerebrovascular accident)

## 2022-06-23 ENCOUNTER — TELEPHONE (OUTPATIENT)
Dept: ORTHOPEDIC SURGERY | Facility: CLINIC | Age: 82
End: 2022-06-23

## 2022-06-23 NOTE — TELEPHONE ENCOUNTER
Caller: PATIENT     Relationship to patient: SELF     Best call back number: [phone]    Patient is needing: PATIENT WOULD LIKE TO SCHEDULE AN INJECTION FOR HER RIGHT SHOULDER.

## 2022-07-08 ENCOUNTER — OFFICE VISIT (OUTPATIENT)
Dept: ORTHOPEDIC SURGERY | Facility: CLINIC | Age: 82
End: 2022-07-08

## 2022-07-08 VITALS — HEIGHT: 64 IN | WEIGHT: 136.8 LBS | HEART RATE: 80 BPM | BODY MASS INDEX: 23.35 KG/M2 | OXYGEN SATURATION: 90 %

## 2022-07-08 DIAGNOSIS — M19.011 PRIMARY OSTEOARTHRITIS OF RIGHT SHOULDER: Primary | ICD-10-CM

## 2022-07-08 PROCEDURE — 20610 DRAIN/INJ JOINT/BURSA W/O US: CPT | Performed by: ORTHOPAEDIC SURGERY

## 2022-07-08 RX ORDER — LEVOTHYROXINE SODIUM 0.1 MG/1
100 TABLET ORAL
COMMUNITY
Start: 2022-04-07 | End: 2023-04-08

## 2022-07-08 RX ORDER — TRIAMCINOLONE ACETONIDE 40 MG/ML
40 INJECTION, SUSPENSION INTRA-ARTICULAR; INTRAMUSCULAR
Status: COMPLETED | OUTPATIENT
Start: 2022-07-08 | End: 2022-07-08

## 2022-07-08 RX ORDER — MELOXICAM 7.5 MG/1
TABLET ORAL
COMMUNITY
End: 2022-07-08

## 2022-07-08 RX ORDER — METOPROLOL SUCCINATE 25 MG/1
TABLET, EXTENDED RELEASE ORAL
COMMUNITY
Start: 2022-05-25

## 2022-07-08 RX ORDER — GABAPENTIN 300 MG/1
CAPSULE ORAL
COMMUNITY
End: 2022-07-08

## 2022-07-08 RX ORDER — HYDROCHLOROTHIAZIDE 25 MG/1
TABLET ORAL
COMMUNITY
End: 2022-07-08

## 2022-07-08 RX ORDER — LIDOCAINE HYDROCHLORIDE 10 MG/ML
9 INJECTION, SOLUTION INFILTRATION; PERINEURAL
Status: COMPLETED | OUTPATIENT
Start: 2022-07-08 | End: 2022-07-08

## 2022-07-08 RX ORDER — FENOFIBRATE 145 MG/1
145 TABLET, COATED ORAL DAILY
COMMUNITY
Start: 2022-03-07

## 2022-07-08 RX ADMIN — TRIAMCINOLONE ACETONIDE 40 MG: 40 INJECTION, SUSPENSION INTRA-ARTICULAR; INTRAMUSCULAR at 13:11

## 2022-07-08 RX ADMIN — LIDOCAINE HYDROCHLORIDE 9 ML: 10 INJECTION, SOLUTION INFILTRATION; PERINEURAL at 13:11

## 2022-07-08 NOTE — PROGRESS NOTES
"Chief Complaint  Follow-up of the Right Shoulder     Subjective      Malika Jha presents to [facility] for a follow-up of right shoulder. She has severe osteoarthritis of the right shoulder, she is treating this conservatively. No new injury or trauma. She has been having increased pain. Previous injections have given relief.     Allergies   Allergen Reactions   • Medrol [Methylprednisolone] Shortness Of Breath   • Nitrofurantoin Shortness Of Breath   • Doxycycline Hyclate Unknown - High Severity   • Levofloxacin Unknown - High Severity   • Penicillins Hives   • Sulfamethoxazole-Trimethoprim Other (See Comments)   • Oxycodone Palpitations        Social History     Socioeconomic History   • Marital status: Single   Tobacco Use   • Smoking status: Never Smoker   Substance and Sexual Activity   • Alcohol use: Defer   • Drug use: Defer   • Sexual activity: Defer        Review of Systems     Objective   Vital Signs:   Pulse 80   Ht 162.6 cm (64\")   Wt 62.1 kg (136 lb 12.8 oz)   SpO2 90%   BMI 23.48 kg/m²       Physical Exam  Constitutional:       Appearance: Normal appearance. Patient is well-developed and normal weight.   HENT:      Head: Normocephalic.      Right Ear: Hearing and external ear normal.      Left Ear: Hearing and external ear normal.      Nose: Nose normal.   Eyes:      Conjunctiva/sclera: Conjunctivae normal.   Cardiovascular:      Rate and Rhythm: Normal rate.   Pulmonary:      Effort: Pulmonary effort is normal.      Breath sounds: No wheezing or rales.   Abdominal:      Palpations: Abdomen is soft.      Tenderness: There is no abdominal tenderness.   Musculoskeletal:      Cervical back: Normal range of motion.   Skin:     Findings: No rash.   Neurological:      Mental Status: Patient  is alert and oriented to person, place, and time.   Psychiatric:         Mood and Affect: Mood and affect normal.         Judgment: Judgment normal.       Ortho Exam      RIGHT " SHOULDER: Good tone of deltoid, biceps, triceps, wrist extensors, and wrist flexors.  Forward elevation to 90 degrees. Abduction to 90 degrees. Tender subacromial bursa. Tender greater tuberosity. Crepitus with motion.       Large Joint Arthrocentesis  Date/Time: 7/8/2022 1:11 PM  Consent given by: patient  Site marked: site marked  Timeout: Immediately prior to procedure a time out was called to verify the correct patient, procedure, equipment, support staff and site/side marked as required   Supporting Documentation  Indications: pain   Procedure Details  Location: RIGHT SHOULDER.  Needle gauge: 21G.  Medications administered: 40 mg triamcinolone acetonide 40 MG/ML; 9 mL lidocaine 1 %                Imaging Results (Most Recent)     None           Result Review :         No results found.           Assessment and Plan     Diagnoses and all orders for this visit:    1. Primary osteoarthritis of right shoulder (Primary)        Right shoulder injection given, patient tolerated this well.     Call or return if worsening symptoms.    Follow Up     PRN.       Patient was given instructions and counseling regarding her condition or for health maintenance advice. Please see specific information pulled into the AVS if appropriate.     Scribed for Roque Moreira MD by Kelly Gomez.  07/08/22   13:08 EDT      I have personally performed the services described in this document as scribed by the above individual and it is both accurate and complete. Roque Moreira MD 07/08/22

## 2022-08-25 ENCOUNTER — PREP FOR SURGERY (OUTPATIENT)
Dept: OTHER | Facility: HOSPITAL | Age: 82
End: 2022-08-25

## 2022-08-25 ENCOUNTER — OFFICE VISIT (OUTPATIENT)
Dept: SURGERY | Facility: CLINIC | Age: 82
End: 2022-08-25

## 2022-08-25 VITALS — WEIGHT: 136.2 LBS | HEIGHT: 64 IN | BODY MASS INDEX: 23.25 KG/M2 | HEART RATE: 81 BPM

## 2022-08-25 DIAGNOSIS — R19.5 ABNORMAL STOOL TEST: Primary | ICD-10-CM

## 2022-08-25 PROCEDURE — 99203 OFFICE O/P NEW LOW 30 MIN: CPT | Performed by: NURSE PRACTITIONER

## 2022-08-25 RX ORDER — TRAMADOL HYDROCHLORIDE 50 MG/1
TABLET ORAL
COMMUNITY
Start: 2022-07-26

## 2022-08-25 RX ORDER — APIXABAN 2.5 MG/1
TABLET, FILM COATED ORAL
COMMUNITY
Start: 2022-08-03

## 2022-08-25 RX ORDER — POLYETHYLENE GLYCOL 3350 17 G/17G
POWDER, FOR SOLUTION ORAL
Qty: 238 PACKET | Refills: 0 | Status: ON HOLD | OUTPATIENT
Start: 2022-08-25 | End: 2022-10-26

## 2022-08-25 NOTE — PROGRESS NOTES
Chief Complaint: Colonoscopy    Subjective      Colonoscopy consultation       History of Present Illness  Malika Jha is a 82 y.o. female presents to [facility] for colonoscopy consultation.    Patient presents today on a referral from Noa MUNOZ for positive cologuard.     Patient denies any abdominal pain, change in bowel habit, or rectal bleeding.    Patient admits to a family history of colon cancer with her sister and brother.    Patient reports that she takes Eliquis daily for A. fib and is under the care of Dr. Solis.  I will get cardiac clearance prior to the procedure.    Objective     Past Medical History:   Diagnosis Date   • Afib (HCC)    • High blood pressure    • Hyperlipidemia    • Neuropathy    • Pancreatitis    • Thyroid cancer (HCC)        Past Surgical History:   Procedure Laterality Date   • BREAST LUMPECTOMY     • GALLBLADDER SURGERY     • HYSTERECTOMY     • INCONTINENCE SURGERY     • THYROIDECTOMY     • TOTAL HIP ARTHROPLASTY REVISION Right        Outpatient Medications Marked as Taking for the 8/25/22 encounter (Office Visit) with Marilu Hoffman, APRMAURI   Medication Sig Dispense Refill   • amLODIPine (NORVASC) 5 MG tablet Take 5 mg by mouth Daily.     • candesartan (ATACAND) 32 MG tablet Take 32 mg by mouth Daily.     • Eliquis 2.5 MG tablet tablet      • fenofibrate (TRICOR) 145 MG tablet Take 145 mg by mouth Daily.     • furosemide (LASIX) 40 MG tablet Take 40 mg by mouth 2 (Two) Times a Day.     • gabapentin (NEURONTIN) 600 MG tablet Take 600 mg by mouth 3 (Three) Times a Day.     • lansoprazole (PREVACID) 30 MG capsule Take 30 mg by mouth Daily.     • levothyroxine (SYNTHROID, LEVOTHROID) 100 MCG tablet Take 100 mcg by mouth.     • magnesium cl-calcium carbonate (SLOW-MAG) 71.5-119 MG tablet delayed-release tablet Take 1 tablet by mouth Every Night.     • metoprolol succinate XL (TOPROL-XL) 25 MG 24 hr tablet      • temazepam (RESTORIL) 15 MG  "capsule Take 15 mg by mouth At Night As Needed for Sleep.     • traMADol (ULTRAM) 50 MG tablet          Allergies   Allergen Reactions   • Medrol [Methylprednisolone] Shortness Of Breath   • Nitrofurantoin Shortness Of Breath   • Doxycycline Hyclate Unknown - High Severity   • Levofloxacin Unknown - High Severity   • Penicillins Hives   • Sulfamethoxazole-Trimethoprim Other (See Comments)   • Oxycodone Palpitations        Family History   Problem Relation Age of Onset   • Stroke Mother    • Diabetes Sister    • Cancer Sister    • Colon cancer Sister    • Colon cancer Brother    • Kidney cancer Brother    • Throat cancer Brother        Social History     Socioeconomic History   • Marital status:    Tobacco Use   • Smoking status: Former Smoker   • Smokeless tobacco: Never Used   Vaping Use   • Vaping Use: Never used   Substance and Sexual Activity   • Alcohol use: Defer   • Drug use: Defer   • Sexual activity: Defer       Review of Systems   Constitutional: Negative for chills and fever.   Gastrointestinal: Negative for abdominal distention, abdominal pain, anal bleeding, blood in stool, constipation, diarrhea and rectal pain.        Vital Signs:   Pulse 81   Ht 162.6 cm (64\")   Wt 61.8 kg (136 lb 3.2 oz)   BMI 23.38 kg/m²      Physical Exam  Vitals and nursing note reviewed.   Constitutional:       General: She is not in acute distress.     Appearance: Normal appearance.   HENT:      Head: Normocephalic.   Cardiovascular:      Rate and Rhythm: Normal rate.   Pulmonary:      Effort: Pulmonary effort is normal.      Breath sounds: No stridor. No wheezing.   Abdominal:      Tenderness: There is no guarding.   Musculoskeletal:         General: No deformity. Normal range of motion.   Skin:     General: Skin is warm and dry.      Coloration: Skin is not jaundiced.   Neurological:      General: No focal deficit present.      Mental Status: She is alert and oriented to person, place, and time.   Psychiatric:       "   Mood and Affect: Mood normal.         Thought Content: Thought content normal.          Result Review :          []  Laboratory  []  Radiology  []  Pathology  []  Microbiology  []  EKG/Telemetry   []  Cardiology/Vascular   [x]  Old records  Hold eliquis 2 days before to the procedure.      Assessment and Plan    Diagnoses and all orders for this visit:    1. Abnormal stool test (Primary)    Other orders  -     polyethylene glycol (MIRALAX) 17 g packet; Take as directed.  Instructions given in office.  Dispense: 238 g bottle  Dispense: 238 packet; Refill: 0    white prep    Follow Up   Return for Scheduled colonoscopy with Dr. Ortega on 10/26/22 @ [facility].     Hospital arrival time: 10:30    Possible risks/complications, benefits, and alternatives to surgical or invasive procedure have been explained to patient and/or legal guardian.     Patient has been evaluated and can tolerate anesthesia and/or sedation. Risks, benefits, and alternatives to anesthesia and sedation have been explained to patient and/or legal guardian.  Patient verbalizes understanding and is will proceed with above plan.    Patient was given instructions and counseling regarding her condition or for health maintenance advice. Please see specific information pulled into the AVS if appropriate.

## 2022-10-21 ENCOUNTER — TELEPHONE (OUTPATIENT)
Dept: SURGERY | Facility: CLINIC | Age: 82
End: 2022-10-21

## 2022-10-21 NOTE — TELEPHONE ENCOUNTER
I tried calling pt this morning to let her know we received feedback from Dr. Nickerson's office to hold Eliquis 2 days. No answer. LMOM

## 2022-10-26 ENCOUNTER — ANESTHESIA EVENT (OUTPATIENT)
Dept: GASTROENTEROLOGY | Facility: HOSPITAL | Age: 82
End: 2022-10-26

## 2022-10-26 ENCOUNTER — ANESTHESIA (OUTPATIENT)
Dept: GASTROENTEROLOGY | Facility: HOSPITAL | Age: 82
End: 2022-10-26

## 2022-10-26 ENCOUNTER — HOSPITAL ENCOUNTER (OUTPATIENT)
Facility: HOSPITAL | Age: 82
Setting detail: HOSPITAL OUTPATIENT SURGERY
Discharge: HOME | End: 2022-10-26
Attending: SURGERY | Admitting: SURGERY

## 2022-10-26 VITALS
OXYGEN SATURATION: 99 % | DIASTOLIC BLOOD PRESSURE: 50 MMHG | RESPIRATION RATE: 16 BRPM | BODY MASS INDEX: 23.08 KG/M2 | WEIGHT: 134.48 LBS | HEART RATE: 67 BPM | SYSTOLIC BLOOD PRESSURE: 112 MMHG | TEMPERATURE: 98.3 F

## 2022-10-26 DIAGNOSIS — R19.5 ABNORMAL STOOL TEST: ICD-10-CM

## 2022-10-26 PROCEDURE — 88305 TISSUE EXAM BY PATHOLOGIST: CPT | Performed by: SURGERY

## 2022-10-26 PROCEDURE — 25010000002 PROPOFOL 10 MG/ML EMULSION: Performed by: NURSE ANESTHETIST, CERTIFIED REGISTERED

## 2022-10-26 DEVICE — DEV CLIP ENDO RESOLUTION360 CONTRL ROT 235CM: Type: IMPLANTABLE DEVICE | Site: COLON | Status: FUNCTIONAL

## 2022-10-26 RX ORDER — SODIUM CHLORIDE, SODIUM LACTATE, POTASSIUM CHLORIDE, CALCIUM CHLORIDE 600; 310; 30; 20 MG/100ML; MG/100ML; MG/100ML; MG/100ML
30 INJECTION, SOLUTION INTRAVENOUS CONTINUOUS
Status: DISCONTINUED | OUTPATIENT
Start: 2022-10-26 | End: 2022-10-26 | Stop reason: HOSPADM

## 2022-10-26 RX ORDER — LIDOCAINE HYDROCHLORIDE 20 MG/ML
INJECTION, SOLUTION EPIDURAL; INFILTRATION; INTRACAUDAL; PERINEURAL AS NEEDED
Status: DISCONTINUED | OUTPATIENT
Start: 2022-10-26 | End: 2022-10-26 | Stop reason: SURG

## 2022-10-26 RX ORDER — PROPOFOL 10 MG/ML
VIAL (ML) INTRAVENOUS AS NEEDED
Status: DISCONTINUED | OUTPATIENT
Start: 2022-10-26 | End: 2022-10-26 | Stop reason: SURG

## 2022-10-26 RX ADMIN — SODIUM CHLORIDE, POTASSIUM CHLORIDE, SODIUM LACTATE AND CALCIUM CHLORIDE 30 ML/HR: 600; 310; 30; 20 INJECTION, SOLUTION INTRAVENOUS at 10:43

## 2022-10-26 RX ADMIN — PROPOFOL 50 MG: 10 INJECTION, EMULSION INTRAVENOUS at 10:52

## 2022-10-26 RX ADMIN — PROPOFOL 125 MCG/KG/MIN: 10 INJECTION, EMULSION INTRAVENOUS at 10:52

## 2022-10-26 RX ADMIN — LIDOCAINE HYDROCHLORIDE 100 MG: 20 INJECTION, SOLUTION EPIDURAL; INFILTRATION; INTRACAUDAL; PERINEURAL at 10:52

## 2022-10-26 NOTE — ANESTHESIA PREPROCEDURE EVALUATION
Anesthesia Evaluation     Patient summary reviewed and Nursing notes reviewed   no history of anesthetic complications:  NPO Solid Status: > 8 hours  NPO Liquid Status: > 2 hours           Airway   Mallampati: II  TM distance: >3 FB  Neck ROM: full  No difficulty expected  Dental - normal exam     Pulmonary - negative pulmonary ROS and normal exam    breath sounds clear to auscultation  Cardiovascular   Exercise tolerance: good (4-7 METS)    ECG reviewed  PT is on anticoagulation therapy  Patient on routine beta blocker and Beta blocker given within 24 hours of surgery  Rhythm: irregular  Rate: abnormal    (+) hypertension 2 medications or greater, dysrhythmias Atrial Fib, hyperlipidemia,     ROS comment: EF 55-60%.  There is evidence of   diastolic dysfunction      Neuro/Psych- negative ROS  GI/Hepatic/Renal/Endo    (+)   thyroid problem hypothyroidism    Musculoskeletal (-) negative ROS    Abdominal    Substance History - negative use     OB/GYN negative ob/gyn ROS         Other      history of cancer    ROS/Med Hx Other: PAT Nursing Notes unavailable.                 Anesthesia Plan    ASA 2     general     (Patient understands anesthesia not responsible for dental damage.)  intravenous induction     Anesthetic plan, risks, benefits, and alternatives have been provided, discussed and informed consent has been obtained with: patient.  Pre-procedure education provided  Use of blood products discussed with patient .   Plan discussed with CRNA.        CODE STATUS:

## 2022-10-26 NOTE — ANESTHESIA POSTPROCEDURE EVALUATION
Patient: Malika Jha    Procedure Summary     Date: 10/26/22 Room / Location: [facility] ENDOSCOPY 1 / [facility] END[facility]COPY    Anesthesia Start: 1050 Anesthesia Stop: 1122    Procedure: COLONOSCOPY POLYPECTOMIES WITH CLIP APPLICATION X1 WITH ORISE Diagnosis:       Abnormal stool test      (Abnormal stool test [R19.5])    Surgeons: Srinivasan Ortega MD Provider: Reyes, Mirabelle, DO    Anesthesia Type: general ASA Status: 2          Anesthesia Type: general    Vitals  Vitals Value Taken Time   /50 10/26/22 1136   Temp 36.5 °C (97.7 °F) 10/26/22 1120   Pulse 64 10/26/22 1141   Resp 16 10/26/22 1125   SpO2 100 % 10/26/22 1141   Vitals shown include unvalidated device data.        Post Anesthesia Care and Evaluation    Patient location during evaluation: bedside  Patient participation: complete - patient participated  Level of consciousness: awake  Pain management: adequate    Airway patency: patent  Anesthetic complications: No anesthetic complications  PONV Status: none  Cardiovascular status: acceptable and stable  Respiratory status: acceptable  Hydration status: acceptable    Comments: An Anesthesiologist personally participated in the most demanding procedures (including induction and emergence if applicable) in the anesthesia plan, monitored the course of anesthesia administration at frequent intervals and remained physically present and available for immediate diagnosis and treatment of emergencies.

## 2022-10-27 LAB
CYTO UR: NORMAL
LAB AP CASE REPORT: NORMAL
LAB AP CLINICAL INFORMATION: NORMAL
PATH REPORT.FINAL DX SPEC: NORMAL
PATH REPORT.GROSS SPEC: NORMAL

## 2023-01-09 ENCOUNTER — TELEPHONE (OUTPATIENT)
Dept: ORTHOPEDIC SURGERY | Facility: CLINIC | Age: 83
End: 2023-01-09

## 2023-01-09 NOTE — TELEPHONE ENCOUNTER
Caller: FRANCIE TAVERAS     Relationship to patient: DAUGHTER    Best call back number: [phone]    Chief complaint: RIGHT SHOULDER INJECTION    Type of visit: RIGHT SHOULDER INJECTION    Requested date: ASAP    If rescheduling, when is the original appointment: NA    Additional notes: PATIENT IS ALSO HAVING AN ISSUE WITH HER RIGHT THUMB. WARM TRANSFERRED OVER TO OFFICE FOR NON [facility].

## 2023-01-18 ENCOUNTER — OFFICE VISIT (OUTPATIENT)
Dept: ORTHOPEDIC SURGERY | Facility: CLINIC | Age: 83
End: 2023-01-18
Payer: MEDICARE

## 2023-01-18 VITALS — WEIGHT: 134 LBS | HEIGHT: 64 IN | BODY MASS INDEX: 22.88 KG/M2

## 2023-01-18 DIAGNOSIS — M79.642 LEFT HAND PAIN: Primary | ICD-10-CM

## 2023-01-18 DIAGNOSIS — M25.511 RIGHT SHOULDER PAIN, UNSPECIFIED CHRONICITY: ICD-10-CM

## 2023-01-18 DIAGNOSIS — M19.011 OSTEOARTHRITIS OF RIGHT SHOULDER, UNSPECIFIED OSTEOARTHRITIS TYPE: ICD-10-CM

## 2023-01-18 DIAGNOSIS — M65.312 TRIGGER THUMB, LEFT THUMB: ICD-10-CM

## 2023-01-18 PROCEDURE — 20610 DRAIN/INJ JOINT/BURSA W/O US: CPT | Performed by: ORTHOPAEDIC SURGERY

## 2023-01-18 PROCEDURE — 99213 OFFICE O/P EST LOW 20 MIN: CPT | Performed by: ORTHOPAEDIC SURGERY

## 2023-01-18 PROCEDURE — 20550 NJX 1 TENDON SHEATH/LIGAMENT: CPT | Performed by: ORTHOPAEDIC SURGERY

## 2023-01-18 RX ADMIN — LIDOCAINE HYDROCHLORIDE 1 ML: 10 INJECTION, SOLUTION INFILTRATION; PERINEURAL at 10:56

## 2023-01-18 RX ADMIN — LIDOCAINE HYDROCHLORIDE 5 ML: 10 INJECTION, SOLUTION INFILTRATION; PERINEURAL at 10:56

## 2023-01-18 RX ADMIN — TRIAMCINOLONE ACETONIDE 40 MG: 40 INJECTION, SUSPENSION INTRA-ARTICULAR; INTRAMUSCULAR at 10:56

## 2023-01-18 NOTE — PROGRESS NOTES
"Chief Complaint  Initial Evaluation of the Left Hand and Follow-up of the Right Shoulder     Subjective      Malika Jha presents to [facility] for initial evaluation of the left hand and follow up of the right shoulder.  She has severe osteoarthritis of the right shoulder, she is treating this conservatively.  Previous injections to her shoulder has given relief. She is complaining of pain in the left thumb and thenar area which started a few weeks ago.  She has had no new injury to either joint.     Allergies   Allergen Reactions   • Medrol [Methylprednisolone] Shortness Of Breath   • Nitrofurantoin Shortness Of Breath   • Doxycycline Hyclate Unknown - High Severity   • Levofloxacin Unknown - High Severity   • Penicillins Hives   • Sulfamethoxazole-Trimethoprim Other (See Comments)   • Oxycodone Palpitations        Social History     Socioeconomic History   • Marital status:    Tobacco Use   • Smoking status: Former   • Smokeless tobacco: Never   Vaping Use   • Vaping Use: Never used   Substance and Sexual Activity   • Alcohol use: Defer   • Drug use: Defer   • Sexual activity: Defer        Review of Systems     Objective   Vital Signs:   Ht 162.6 cm (64\")   Wt 60.8 kg (134 lb)   BMI 23.00 kg/m²       Physical Exam  Constitutional:       Appearance: Normal appearance. Patient is well-developed and normal weight.   HENT:      Head: Normocephalic.      Right Ear: Hearing and external ear normal.      Left Ear: Hearing and external ear normal.      Nose: Nose normal.   Eyes:      Conjunctiva/sclera: Conjunctivae normal.   Cardiovascular:      Rate and Rhythm: Normal rate.   Pulmonary:      Effort: Pulmonary effort is normal.      Breath sounds: No wheezing or rales.   Abdominal:      Palpations: Abdomen is soft.      Tenderness: There is no abdominal tenderness.   Musculoskeletal:      Cervical back: Normal range of motion.   Skin:     Findings: No rash.   Neurological:      " Mental Status: Patient  is alert and oriented to person, place, and time.   Psychiatric:         Mood and Affect: Mood and affect normal.         Judgment: Judgment normal.       Ortho Exam      LEFT HAND Negative Compression testing/ Negative Tinels. NegativeFinkelsteins. Negative Little's testing. Positive CMC grind testing. Negative Phalens. Full ROM of the hand, fingers, elbow and wrist. Positive Triggering of the digit. Sensation grossly intact to light touch, median, radial and ulnar nerve. Positive AIN, PIN and ulnar nerve motor function intact. Axillary nerve intact. Positive pulses.         RIGHT SHOULDER Forward flexion 40. Abduction 40. External rotation 20. Internal rotation side of hip.Supraspinatus strength 2/5. Infraspinatus Strength 2/5. Infrared subscap 2/5. Negative Carrasco. Negative Neer. Negative Apprehension. Positive Lift off. (Negative Obriens. Sensation intact to light touch, median, radial, ulnar nerve. Positive AIN, PIN, ulnar nerve motor. Positive pulses. Negative Impingement signs. Good strength in triceps, biceps, deltoid, wrist extensors and wrist flexors. She has severe AC joint arthritis.         Small Joint Arthrocentesis/left thumb  Consent given by: patient  Site marked: site marked  Timeout: Immediately prior to procedure a time out was called to verify the correct patient, procedure, equipment, support staff and site/side marked as required   Procedure Details  Location: thumb -   Preparation: Patient was prepped and draped in the usual sterile fashion  Needle gauge: 23g.  Medications administered: 40 mg triamcinolone acetonide 40 MG/ML; 1 mL lidocaine 1 %  Patient tolerance: patient tolerated the procedure well with no immediate complications    Large Joint Arthrocentesis: R subacromial bursa  Date/Time: 1/18/2023 10:56 AM  Consent given by: patient  Site marked: site marked  Timeout: Immediately prior to procedure a time out was called to verify the correct patient, procedure,  equipment, support staff and site/side marked as required   Supporting Documentation  Indications: pain   Procedure Details  Location: shoulder - R subacromial bursa  Preparation: Patient was prepped and draped in the usual sterile fashion  Needle size: 22 G  Medications administered: 40 mg triamcinolone acetonide 40 MG/ML; 5 mL lidocaine 1 %  Patient tolerance: patient tolerated the procedure well with no immediate complications              Imaging Results (Most Recent)     Procedure Component Value Units Date/Time    XR Scapula Right [[id number]] Resulted: 01/18/23 1007     Updated: 01/18/23 1010    XR Hand 2 View Left [[id number]] Resulted: 01/18/23 1007     Updated: 01/18/23 1010           Result Review :     X-Ray Report:  Left hand  X-Ray  Indication: Evaluation of the left hand  AP/Lateral view(s)  Findings: No dislocation or fractures.   Prior studies available for comparison: No    X-Ray Report:  Right scapula X-Ray  Indication: Evaluation of the right scapula  AP/Lateral view(s)  Findings: Advanced degenerative changes to the scapula.   Prior studies available for comparison: No             Assessment and Plan     Diagnoses and all orders for this visit:    1. Left hand pain (Primary)  -     XR Hand 2 View Left  -     Small Joint Arthrocentesis/left thumb    2. Right shoulder pain, unspecified chronicity  -     XR Scapula Right  -     Large Joint Arthrocentesis: R subacromial bursa    3. Osteoarthritis of right shoulder, unspecified osteoarthritis type    4. Trigger thumb, left thumb        I reviewed the X-rays that were obtained today with the patient. Discussed the treatment plan with the patient. Discussed the risks and benefits of conservative treatment for the left thumb as injections and HEP exercises.  Discussed the treatment options with the patient, operative vs non-operative for the right shoulder as advised by PCP and other specialist if appropriate for surgery.  The patient expressed  understanding and wished to proceed with left thumb injection and right shoulder steroid injection. She tolerated the injections well.     Call or return if worsening symptoms.    Follow Up     PRN      Patient was given instructions and counseling regarding her condition or for health maintenance advice. Please see specific information pulled into the AVS if appropriate.     Scribed for Roque Moreira MD by Gwen De Los Santos MA.  01/18/23   10:30 EST    I have personally performed the services described in this document as scribed by the above individual and it is both accurate and complete. Roque Moreira MD 01/19/23

## 2023-01-19 RX ORDER — LIDOCAINE HYDROCHLORIDE 10 MG/ML
5 INJECTION, SOLUTION INFILTRATION; PERINEURAL
Status: COMPLETED | OUTPATIENT
Start: 2023-01-18 | End: 2023-01-18

## 2023-01-19 RX ORDER — TRIAMCINOLONE ACETONIDE 40 MG/ML
40 INJECTION, SUSPENSION INTRA-ARTICULAR; INTRAMUSCULAR
Status: COMPLETED | OUTPATIENT
Start: 2023-01-18 | End: 2023-01-18

## 2023-01-19 RX ORDER — LIDOCAINE HYDROCHLORIDE 10 MG/ML
1 INJECTION, SOLUTION INFILTRATION; PERINEURAL
Status: COMPLETED | OUTPATIENT
Start: 2023-01-18 | End: 2023-01-18

## 2023-07-03 PROBLEM — M65.311 TRIGGER THUMB, RIGHT THUMB: Status: ACTIVE | Noted: 2023-07-03

## 2023-07-26 ENCOUNTER — OFFICE VISIT (OUTPATIENT)
Dept: ORTHOPEDIC SURGERY | Facility: CLINIC | Age: 83
End: 2023-07-26
Payer: MEDICARE

## 2023-07-26 VITALS
SYSTOLIC BLOOD PRESSURE: 170 MMHG | DIASTOLIC BLOOD PRESSURE: 71 MMHG | TEMPERATURE: 98 F | HEIGHT: 64 IN | WEIGHT: 142 LBS | OXYGEN SATURATION: 94 % | BODY MASS INDEX: 24.24 KG/M2 | HEART RATE: 71 BPM

## 2023-07-26 DIAGNOSIS — Z47.89 AFTERCARE FOLLOWING SURGERY OF THE MUSCULOSKELETAL SYSTEM: Primary | ICD-10-CM

## 2023-07-26 PROCEDURE — 99024 POSTOP FOLLOW-UP VISIT: CPT | Performed by: PHYSICIAN ASSISTANT

## 2023-07-26 NOTE — PROGRESS NOTES
"Chief Complaint  Follow-up and Pain of the Right Thumb and Suture / Staple Removal    Subjective      Malika Jha presents to [facility] for follow-up of right trigger thumb release performed on 7/13/2023 by Dr. Moreira.  She presents today reporting that \"stitches came out last week\".  She reports that she \"keeps forgetting and grabbing things\".  She has had no further triggering of her thumb.    Objective   Allergies   Allergen Reactions    Medrol [Methylprednisolone] Shortness Of Breath    Nitrofurantoin Shortness Of Breath    Doxycycline Hyclate Unknown - High Severity    Levofloxacin Unknown - High Severity    Penicillins Hives    Sulfamethoxazole-Trimethoprim Other (See Comments)    Oxycodone Palpitations       Vital Signs:   /71   Pulse 71   Temp 98 °F (36.7 °C)   Ht 162.6 cm (64\")   Wt 64.4 kg (142 lb)   SpO2 94%   BMI 24.37 kg/m²       Physical Exam    Constitutional: Awake, alert. Well nourished appearance.    Integumentary: Warm, dry, intact. No obvious rashes.    HENT: Atraumatic, normocephalic.   Respiratory: Non labored respirations .   Cardiovascular: Intact peripheral pulses.    Psychiatric: Normal mood and affect. A&O X3    Ortho Exam  Right hand: Skin is warm, dry, and intact.  Well-healing surgical incision noted to the A1 pulley of the patient's right thumb.  Patient is able to form a full fist.  Good thumb opposition.  Sensation intact light touch.  Distal neurovascular intact.    Imaging Results (Most Recent)       None                      Assessment and Plan   Problem List Items Addressed This Visit    None  Visit Diagnoses       S/p R trigger thumb release    -  Primary            Follow Up   Return if symptoms worsen or fail to improve.    Tobacco Use: Medium Risk    Smoking Tobacco Use: Former    Smokeless Tobacco Use: Never    Passive Exposure: Not on file     Patient is a former smoker.  Encouraged continued tobacco cessation.  Did not discuss " options for smoking cessation.    Patient Instructions   Sutures removed in office.  Patient educated on incision care.  Please keep incision clean and dry.  Do not soak or submerge in water until incision is fully healed.  Do not apply creams or lotions over the incision. Continue icing and elevation as needed to help with pain and swelling.  Ice up to 3 or 4 times daily for no longer than 15 to 20 minutes at a time.    Patient advised on gradual return to activity as tolerated following complete wound healing. Avoid repetitive ROM of the hand or wrist.  No heavy lifting, pushing, or pulling until incision is fully healed.      Follow-up as needed. Please call with questions or concerns.    Patient was given instructions and counseling regarding her condition or for health maintenance advice. Please see specific information pulled into the AVS if appropriate.

## 2023-07-26 NOTE — PATIENT INSTRUCTIONS
Sutures removed in office.  Patient educated on incision care.  Please keep incision clean and dry.  Do not soak or submerge in water until incision is fully healed.  Do not apply creams or lotions over the incision. Continue icing and elevation as needed to help with pain and swelling.  Ice up to 3 or 4 times daily for no longer than 15 to 20 minutes at a time.    Patient advised on gradual return to activity as tolerated following complete wound healing. Avoid repetitive ROM of the hand or wrist.  No heavy lifting, pushing, or pulling until incision is fully healed.      Follow-up as needed. Please call with questions or concerns.

## 2023-11-13 ENCOUNTER — OFFICE VISIT (OUTPATIENT)
Dept: ORTHOPEDIC SURGERY | Facility: CLINIC | Age: 83
End: 2023-11-13
Payer: MEDICARE

## 2023-11-13 VITALS
WEIGHT: 140 LBS | HEART RATE: 66 BPM | SYSTOLIC BLOOD PRESSURE: 179 MMHG | OXYGEN SATURATION: 91 % | HEIGHT: 64 IN | BODY MASS INDEX: 23.9 KG/M2 | DIASTOLIC BLOOD PRESSURE: 64 MMHG

## 2023-11-13 DIAGNOSIS — M19.011 OSTEOARTHRITIS OF RIGHT SHOULDER, UNSPECIFIED OSTEOARTHRITIS TYPE: ICD-10-CM

## 2023-11-13 DIAGNOSIS — M25.511 RIGHT SHOULDER PAIN, UNSPECIFIED CHRONICITY: Primary | ICD-10-CM

## 2023-11-13 PROCEDURE — 20610 DRAIN/INJ JOINT/BURSA W/O US: CPT | Performed by: ORTHOPAEDIC SURGERY

## 2023-11-13 PROCEDURE — 3078F DIAST BP <80 MM HG: CPT | Performed by: ORTHOPAEDIC SURGERY

## 2023-11-13 PROCEDURE — 3077F SYST BP >= 140 MM HG: CPT | Performed by: ORTHOPAEDIC SURGERY

## 2023-11-13 RX ORDER — TRIAMCINOLONE ACETONIDE 40 MG/ML
40 INJECTION, SUSPENSION INTRA-ARTICULAR; INTRAMUSCULAR
Status: COMPLETED | OUTPATIENT
Start: 2023-11-13 | End: 2023-11-13

## 2023-11-13 RX ORDER — LIDOCAINE HYDROCHLORIDE 10 MG/ML
5 INJECTION, SOLUTION INFILTRATION; PERINEURAL
Status: COMPLETED | OUTPATIENT
Start: 2023-11-13 | End: 2023-11-13

## 2023-11-13 RX ADMIN — TRIAMCINOLONE ACETONIDE 40 MG: 40 INJECTION, SUSPENSION INTRA-ARTICULAR; INTRAMUSCULAR at 10:07

## 2023-11-13 RX ADMIN — LIDOCAINE HYDROCHLORIDE 5 ML: 10 INJECTION, SOLUTION INFILTRATION; PERINEURAL at 10:07

## 2023-11-13 NOTE — PROGRESS NOTES
"Chief Complaint  Follow-up of the Right Shoulder     Subjective      Malika Jha presents to [facility] for follow up of the right shoulder. Due to the patients high blood pressure reading today, I advised the patient to contact their PCP.  She had a steroid injection on 1/18/23 that gave some relief.  She notes the injection was helpful but is starting to pain her again.     Allergies   Allergen Reactions    Medrol [Methylprednisolone] Shortness Of Breath    Nitrofurantoin Shortness Of Breath    Doxycycline Hyclate Unknown - High Severity    Levofloxacin Unknown - High Severity    Penicillins Hives    Sulfamethoxazole-Trimethoprim Other (See Comments)    Oxycodone Palpitations        Social History     Socioeconomic History    Marital status:    Tobacco Use    Smoking status: Former    Smokeless tobacco: Never   Vaping Use    Vaping Use: Never used   Substance and Sexual Activity    Alcohol use: Defer    Drug use: Defer    Sexual activity: Defer        I reviewed the patient's chief complaint, history of present illness, review of systems, past medical history, surgical history, family history, social history, medications, and allergy list.     Review of Systems     Constitutional: Denies fevers, chills, weight loss  Cardiovascular: Denies chest pain, shortness of breath  Skin: Denies rashes, acute skin changes  Neurologic: Denies headache, loss of consciousness        Vital Signs:   /64   Pulse 66   Ht 162.6 cm (64\")   Wt 63.5 kg (140 lb)   SpO2 91%   BMI 24.03 kg/m²          Physical Exam  General: Alert. No acute distress    Ortho Exam        RIGHT SHOULDER Sensation intact to light touch, median, radial, ulnar nerve. Positive AIN, PIN, ulnar nerve motor. Positive pulses.Good strength in triceps, biceps, deltoid, wrist extensors and wrist flexors.       Right shoulder: R subacromial bursa  Date/Time: 11/13/2023 10:07 AM  Consent given by: patient  Site marked: " site marked  Timeout: Immediately prior to procedure a time out was called to verify the correct patient, procedure, equipment, support staff and site/side marked as required   Supporting Documentation  Indications: pain   Procedure Details  Location: shoulder - R subacromial bursa  Needle size: 22 G  Medications administered: 5 mL lidocaine 1 %; 40 mg triamcinolone acetonide 40 MG/ML  Patient tolerance: patient tolerated the procedure well with no immediate complications              Imaging Results (Most Recent)       None             Result Review :             Assessment and Plan     Diagnoses and all orders for this visit:    1. Right shoulder pain, unspecified chronicity (Primary)    2. Osteoarthritis of right shoulder, unspecified osteoarthritis type        Discussed the treatment plan with the patient.     Discussed the risks and benefits of conservative measures.  The patient expressed understanding and wished to proceed with a right shoulder steroid injection.  She tolerated the injection well.       Call or return if worsening symptoms.    Follow Up     PRN      Patient was given instructions and counseling regarding her condition or for health maintenance advice. Please see specific information pulled into the AVS if appropriate.     Scribed for Roque Moreira MD by Gwen De Los Santos MA.  11/13/23   09:36 EST      I have personally performed the services described in this document as scribed by the above individual and it is both accurate and complete. Roque Moreira MD 11/13/23

## 2024-01-31 ENCOUNTER — PREP FOR SURGERY (OUTPATIENT)
Dept: OTHER | Facility: HOSPITAL | Age: 84
End: 2024-01-31
Payer: MEDICARE

## 2024-01-31 ENCOUNTER — OFFICE VISIT (OUTPATIENT)
Dept: ORTHOPEDIC SURGERY | Facility: CLINIC | Age: 84
End: 2024-01-31
Payer: MEDICARE

## 2024-01-31 VITALS
HEIGHT: 64 IN | SYSTOLIC BLOOD PRESSURE: 165 MMHG | HEART RATE: 66 BPM | DIASTOLIC BLOOD PRESSURE: 77 MMHG | OXYGEN SATURATION: 91 % | BODY MASS INDEX: 24.75 KG/M2 | WEIGHT: 145 LBS

## 2024-01-31 DIAGNOSIS — M19.011 OSTEOARTHRITIS OF RIGHT SHOULDER, UNSPECIFIED OSTEOARTHRITIS TYPE: ICD-10-CM

## 2024-01-31 DIAGNOSIS — Z47.89 AFTERCARE FOLLOWING SURGERY OF THE MUSCULOSKELETAL SYSTEM: Primary | ICD-10-CM

## 2024-01-31 DIAGNOSIS — M25.512 LEFT SHOULDER PAIN, UNSPECIFIED CHRONICITY: Primary | ICD-10-CM

## 2024-01-31 DIAGNOSIS — M19.012 OSTEOARTHRITIS OF LEFT SHOULDER, UNSPECIFIED OSTEOARTHRITIS TYPE: ICD-10-CM

## 2024-01-31 DIAGNOSIS — M25.511 RIGHT SHOULDER PAIN, UNSPECIFIED CHRONICITY: ICD-10-CM

## 2024-01-31 RX ORDER — CEFAZOLIN SODIUM IN 0.9 % NACL 3 G/100 ML
3 INTRAVENOUS SOLUTION, PIGGYBACK (ML) INTRAVENOUS ONCE
OUTPATIENT
Start: 2024-01-31 | End: 2024-01-31

## 2024-01-31 RX ORDER — TRANEXAMIC ACID 10 MG/ML
1000 INJECTION, SOLUTION INTRAVENOUS ONCE
OUTPATIENT
Start: 2024-01-31 | End: 2024-01-31

## 2024-01-31 RX ORDER — LIDOCAINE HYDROCHLORIDE 10 MG/ML
5 INJECTION, SOLUTION INFILTRATION; PERINEURAL
Status: COMPLETED | OUTPATIENT
Start: 2024-01-31 | End: 2024-01-31

## 2024-01-31 RX ORDER — TRIAMCINOLONE ACETONIDE 40 MG/ML
40 INJECTION, SUSPENSION INTRA-ARTICULAR; INTRAMUSCULAR
Status: COMPLETED | OUTPATIENT
Start: 2024-01-31 | End: 2024-01-31

## 2024-01-31 RX ORDER — CEFAZOLIN SODIUM 2 G/100ML
2 INJECTION, SOLUTION INTRAVENOUS ONCE
OUTPATIENT
Start: 2024-01-31 | End: 2024-01-31

## 2024-01-31 RX ADMIN — TRIAMCINOLONE ACETONIDE 40 MG: 40 INJECTION, SUSPENSION INTRA-ARTICULAR; INTRAMUSCULAR at 10:52

## 2024-01-31 RX ADMIN — LIDOCAINE HYDROCHLORIDE 5 ML: 10 INJECTION, SOLUTION INFILTRATION; PERINEURAL at 10:52

## 2024-01-31 NOTE — PROGRESS NOTES
"Chief Complaint  Initial Evaluation of the Left Shoulder and Follow-up of the Right Shoulder     Subjective      Malika Jha presents to [facility] for initial evaluation of the left shoulder. She is having pain in the left shoulder for several weeks.  She has pain with certain movements.  She has pain in the anterior aspect of the shoulder. She wants to discuss surgical intervention of the right shoulder.  She has had pain in the right shoulder for years she has treated conservatively with injections, anti inflammatory and exercises.  She notes the pain in the right shoulder is affecting her daily tasks and ADLS.      Allergies   Allergen Reactions    Medrol [Methylprednisolone] Shortness Of Breath    Nitrofurantoin Shortness Of Breath    Doxycycline Hyclate Unknown - High Severity    Levofloxacin Unknown - High Severity    Penicillins Hives    Sulfamethoxazole-Trimethoprim Other (See Comments)    Oxycodone Palpitations        Social History     Socioeconomic History    Marital status:    Tobacco Use    Smoking status: Former    Smokeless tobacco: Never   Vaping Use    Vaping Use: Never used   Substance and Sexual Activity    Alcohol use: Defer    Drug use: Defer    Sexual activity: Defer        I reviewed the patient's chief complaint, history of present illness, review of systems, past medical history, surgical history, family history, social history, medications, and allergy list.     Review of Systems     Constitutional: Denies fevers, chills, weight loss  Cardiovascular: Denies chest pain, shortness of breath  Skin: Denies rashes, acute skin changes  Neurologic: Denies headache, loss of consciousness        Vital Signs:   /77   Pulse 66   Ht 162.6 cm (64\")   Wt 65.8 kg (145 lb)   SpO2 91%   BMI 24.89 kg/m²          Physical Exam  General: Alert. No acute distress    Ortho Exam        LEFT SHOULDER Forward flexion 170. Abduction 100. External rotation 60. " Internal rotation SI joint. Positive Cross body adduction. Supraspinatus strength 5/5. Infraspinatus Strength 5/5. Infrared subscap 5/5. Positive Carrasco. Positive Neer. Negative Apprehension. Negative Lift off. (Negative Obriens. Sensation intact to light touch, median, radial, ulnar nerve. Positive AIN, PIN, ulnar nerve motor. Positive pulses. Positive Impingement signs. Good strength in triceps, biceps, deltoid, wrist extensors and wrist flexors. Tender to palpation to the anterior aspect of the shoulder and down the arm.      RIGHT SHOULDER Forward flexion 140. Abduction 100. External rotation 40. Internal rotation side of hip. Positive Cross body adduction. Supraspinatus strength 3/5. Infraspinatus Strength 3/5. Infrared subscap 3/5. Positive Carrasco. Positive Neer. Negative Apprehension. Negative Lift off. (Negative Obriens. Sensation intact to light touch, median, radial, ulnar nerve. Positive AIN, PIN, ulnar nerve motor. Positive pulses. Positive Impingement signs. Good strength in triceps, biceps, deltoid, wrist extensors and wrist flexors. Tender to palpation to the anterior aspect of the shoulder and down the arm.        Large Joint Arthrocentesis: L subacromial bursa  Date/Time: 1/31/2024 10:52 AM  Consent given by: patient  Site marked: site marked  Timeout: Immediately prior to procedure a time out was called to verify the correct patient, procedure, equipment, support staff and site/side marked as required   Supporting Documentation  Indications: pain   Procedure Details  Location: shoulder - L subacromial bursa  Needle size: 22 G  Medications administered: 5 mL lidocaine 1 %; 40 mg triamcinolone acetonide 40 MG/ML  Patient tolerance: patient tolerated the procedure well with no immediate complications            Imaging Results (Most Recent)       Procedure Component Value Units Date/Time    XR Scapula Left [[id number]] Resulted: 01/31/24 1101     Updated: 01/31/24 1101    Narrative:      X-Ray  Report:  Left scapula X-Ray  Indication: Evaluation of the left scapula  AP/Lateral view(s)  Findings: Moderate - severe arthritis.    Prior studies available for comparison: no                   Result Review :     X-Ray Report:  Left scapula X-Ray  Indication: Evaluation of the left scapula  AP/Lateral view(s)  Findings: Moderate - severe arthritis.    Prior studies available for comparison: no             Assessment and Plan     Diagnoses and all orders for this visit:    1. Left shoulder pain, unspecified chronicity (Primary)  -     XR Scapula Left  -     Large Joint Arthrocentesis: L subacromial bursa    2. Right shoulder pain, unspecified chronicity    3. Osteoarthritis of right shoulder, unspecified osteoarthritis type  -     CT shoulder right wo contrast; Future    4. Osteoarthritis of left shoulder, unspecified osteoarthritis type        Discussed the treatment plan with the patient. I reviewed the X-rays that were obtained today with the patient.     Discussed the risks and benefits of conservative measures.  The patient expressed understanding and wished to proceed with a left shoulder steroid injection.  She tolerated the injection well.     CT of the right shoulder.     Discussed the treatment options with the patient, operative vs non-operative. The patient expressed understanding and wished to proceed with a right reverse total shoulder.       Discussed surgery., Risks/benefits discussed with patient including, but not limited to: infection, bleeding, neurovascular damage, re-rupture, aesthetic deformity, need for further surgery, and death., Discussed with patient the implant type being used during surgery and patient understands., Surgery pamphlet given., Call or return if worsening symptoms., and DME order for a 3 in 1 given today due to patient will be confined to one room/level of the home that does not offer a toilet during postop recovery.     Follow Up     After CT of the right shoulder.       2 weeks postoperatively     Patient was given instructions and counseling regarding her condition or for health maintenance advice. Please see specific information pulled into the AVS if appropriate.     Scribed for Roque Moreira MD by Gwen De Los Santos MA.  01/31/24   10:07 EST      I have personally performed the services described in this document as scribed by the above individual and it is both accurate and complete. Roque Moreira MD 01/31/24

## 2024-02-29 ENCOUNTER — HOSPITAL ENCOUNTER (OUTPATIENT)
Dept: CT IMAGING | Facility: HOSPITAL | Age: 84
Discharge: HOME | End: 2024-02-29
Admitting: ORTHOPAEDIC SURGERY
Payer: MEDICARE

## 2024-02-29 DIAGNOSIS — M19.011 OSTEOARTHRITIS OF RIGHT SHOULDER, UNSPECIFIED OSTEOARTHRITIS TYPE: ICD-10-CM

## 2024-02-29 PROCEDURE — 73200 CT UPPER EXTREMITY W/O DYE: CPT

## 2024-03-07 ENCOUNTER — TELEPHONE (OUTPATIENT)
Dept: ORTHOPEDIC SURGERY | Facility: CLINIC | Age: 84
End: 2024-03-07
Payer: MEDICARE

## 2024-03-07 NOTE — TELEPHONE ENCOUNTER
Caller: FRANCIE  Relationship to Patient: SELF  Phone Number: [phone] (home)     Reason for Call: PATIENT CALLED STATING THAT SHE WOULD LIKE TO CANCEL HER SX, SHE NO LONGER WANTS TO DO THE SX

## 2024-03-07 NOTE — TELEPHONE ENCOUNTER
SPOKE WITH PATIENT, SX FOR 3/19 AND PRE-OP FOR 3/13 HAVE BEEN CX'D PER PATIENT'S REQUEST. PATIENT STATES THAT SHE IS CONCERNED DUE TO HER AGE AND DOES NOT FEEL COMFORTABLE PROCEEDING WITH SURGERY. SHE STATES SHE MAY CALL BACK IN TO SCHEDULE AN APPOINTMENT TO DISCUSS INJECTIONS.     SPOKE WITH KYREE IN SCHEDULING, SX FOR 3/19/2024 HAS BEEN CANCELED.

## 2024-03-18 ENCOUNTER — OFFICE VISIT (OUTPATIENT)
Dept: ORTHOPEDIC SURGERY | Facility: CLINIC | Age: 84
End: 2024-03-18
Payer: MEDICARE

## 2024-03-18 VITALS
HEART RATE: 67 BPM | BODY MASS INDEX: 24.75 KG/M2 | WEIGHT: 145 LBS | OXYGEN SATURATION: 96 % | DIASTOLIC BLOOD PRESSURE: 75 MMHG | HEIGHT: 64 IN | SYSTOLIC BLOOD PRESSURE: 191 MMHG

## 2024-03-18 DIAGNOSIS — M19.012 OSTEOARTHRITIS OF LEFT SHOULDER, UNSPECIFIED OSTEOARTHRITIS TYPE: ICD-10-CM

## 2024-03-18 DIAGNOSIS — M19.011 OSTEOARTHRITIS OF RIGHT SHOULDER, UNSPECIFIED OSTEOARTHRITIS TYPE: ICD-10-CM

## 2024-03-18 DIAGNOSIS — M25.512 LEFT SHOULDER PAIN, UNSPECIFIED CHRONICITY: ICD-10-CM

## 2024-03-18 DIAGNOSIS — M25.511 RIGHT SHOULDER PAIN, UNSPECIFIED CHRONICITY: Primary | ICD-10-CM

## 2024-03-18 PROCEDURE — 99213 OFFICE O/P EST LOW 20 MIN: CPT | Performed by: ORTHOPAEDIC SURGERY

## 2024-03-18 PROCEDURE — 3077F SYST BP >= 140 MM HG: CPT | Performed by: ORTHOPAEDIC SURGERY

## 2024-03-18 PROCEDURE — 3078F DIAST BP <80 MM HG: CPT | Performed by: ORTHOPAEDIC SURGERY

## 2024-03-18 RX ORDER — HYDROCODONE BITARTRATE AND ACETAMINOPHEN 5; 325 MG/1; MG/1
1 TABLET ORAL EVERY 6 HOURS PRN
Qty: 28 TABLET | Refills: 0 | Status: SHIPPED | OUTPATIENT
Start: 2024-03-18

## 2024-03-18 NOTE — PROGRESS NOTES
"Chief Complaint  Follow-up and Pain of the Left Shoulder and Follow-up of the Right Shoulder     Subjective      Malika Jha presents to [facility] for follow up of the left shoulder.  She had a CT of the right shoulder and is here to review.  She was going to have surgery and it was cancelled as she does not to have surgery.  She is here for the left shoulder and was in the ER last week due to the pain.  They had another X ray and is here to discuss options that are conservative. She had a left shoulder steroid injection on 1/31/24.     Allergies   Allergen Reactions    Medrol [Methylprednisolone] Shortness Of Breath    Nitrofurantoin Shortness Of Breath    Doxycycline Hyclate Unknown - High Severity    Levofloxacin Unknown - High Severity    Penicillins Hives    Sulfamethoxazole-Trimethoprim Other (See Comments)    Oxycodone Palpitations        Social History     Socioeconomic History    Marital status:    Tobacco Use    Smoking status: Former    Smokeless tobacco: Never   Vaping Use    Vaping status: Never Used   Substance and Sexual Activity    Alcohol use: Defer    Drug use: Defer    Sexual activity: Defer        I reviewed the patient's chief complaint, history of present illness, review of systems, past medical history, surgical history, family history, social history, medications, and allergy list.     Review of Systems     Constitutional: Denies fevers, chills, weight loss  Cardiovascular: Denies chest pain, shortness of breath  Skin: Denies rashes, acute skin changes  Neurologic: Denies headache, loss of consciousness        Vital Signs:   BP (!) 191/75   Pulse 67   Ht 162.6 cm (64\")   Wt 65.8 kg (145 lb)   SpO2 96%   BMI 24.89 kg/m²          Physical Exam  General: Alert. No acute distress    Ortho Exam        RIGHT SHOULDER Forward flexion 140. Abduction 100. External rotation 40. Internal rotation side of hip. Positive Cross body adduction. Supraspinatus " strength 3/5. Infraspinatus Strength 3/5. Infrared subscap 3/5. Positive Carrasco. Positive Neer. Negative Apprehension. Negative Lift off. (Negative Obriens. Sensation intact to light touch, median, radial, ulnar nerve. Positive AIN, PIN, ulnar nerve motor. Positive pulses. Positive Impingement signs. Good strength in triceps, biceps, deltoid, wrist extensors and wrist flexors. Tender to palpation to the anterior aspect of the shoulder and down the arm.       LEFT SHOULDER Forward flexion 170. Abduction 100. External rotation 60. Internal rotation SI joint. Positive Cross body adduction. Supraspinatus strength 5/5. Infraspinatus Strength 5/5. Infrared subscap 5/5. Positive Carrasco. Positive Neer. Negative Apprehension. Negative Lift off. (Negative Obriens. Sensation intact to light touch, median, radial, ulnar nerve. Positive AIN, PIN, ulnar nerve motor. Positive pulses. Positive Impingement signs. Good strength in triceps, biceps, deltoid, wrist extensors and wrist flexors. Tender to palpation to the anterior aspect of the shoulder and down the arm.   Moderate effusion of the left shoulder.      Procedures        Imaging Results (Most Recent)       None             Result Review :       CT shoulder right wo contrast    Result Date: 3/4/2024  Narrative: PROCEDURE: CT SHOULDER RIGHT WO CONTRAST  COMPARISON: [facility], XR SCAPULA RIGHT, 1/18/2023, 10:14.  INDICATIONS: right shoulder pain/no trauma/arthritis  TECHNIQUE: After obtaining the patient's consent, multi-planar CT images of the extremity were created without non-ionic intravenous contrast.   PROTOCOL:   Standard imaging protocol performed    RADIATION:   DLP: 179mGy*cm   Automated exposure control was utilized to minimize radiation dose.  FINDINGS:  Severe glenohumeral degenerative change including prominent marginal osteophyte formations, bone-on-bone articulation, and mechanical remodeling.  There is associated moderate glenoid bone stock  loss.  Sequela of chronic rotator cuff pathology with severe supraspinatus and subscapularis fatty muscle atrophy.  There is a large subacromial spur with likely pseudoarticulation involving the greater tuberosity.  Severe acromioclavicular joint degenerative change.  No evidence of os acromiale.  Large joint effusion with scattered osteochondral bodies.  There is no evidence of fracture or suspicious osseous lesion.  The included soft tissues show no acute abnormality.  Calcified granulomas in the right lung.      Impression:  Severe glenohumeral and acromioclavicular joint degenerative changes as above.  Sequela of chronic rotator cuff pathology with severe supraspinatus and subscapularis fatty muscle atrophy.  No fracture.     CALEB JUAREZ MD       Electronically Signed and Approved By: CALEB JUAREZ MD on 3/04/2024 at 8:09                     Assessment and Plan     Diagnoses and all orders for this visit:    1. Right shoulder pain, unspecified chronicity (Primary)    2. Osteoarthritis of right shoulder, unspecified osteoarthritis type    3. Left shoulder pain, unspecified chronicity    4. Osteoarthritis of left shoulder, unspecified osteoarthritis type        Discussed the treatment plan with the patient. I reviewed the CT with the patient. I reviewed the X-rays that were obtained brought on CD with the patient.     Discussed the treatment options with the patient, operative vs non-operative. The patient will be a candidate for either shoulder for a reverse total shoulder.     Discussed injections 3-4 times a year.     Prescribed small dose of pain medication.     Discussed pain medication can be addressed by PCP or get a referral for pain management.     Call or return if worsening symptoms.    Follow Up     PRN      Patient was given instructions and counseling regarding her condition or for health maintenance advice. Please see specific information pulled into the AVS if appropriate.     Scribed for Roque FLORIAN  MD Harish by Gwen De Los Santos MA.  03/18/24   10:17 EDT      I have personally performed the services described in this document as scribed by the above individual and it is both accurate and complete. Roque Moreira MD 03/18/24

## 2024-04-11 ENCOUNTER — TELEPHONE (OUTPATIENT)
Dept: ORTHOPEDIC SURGERY | Facility: CLINIC | Age: 84
End: 2024-04-11
Payer: MEDICARE

## 2024-04-11 NOTE — TELEPHONE ENCOUNTER
I called and spoke with daughter and told her per Dr. Moreira's last office note that Dr. Moreira would give her that one small dose of pain medication but any further pain medication would have to come from PCP or we could refer to pain management.  Patient's daughter said that she would let her mother know and voiced understanding.  I explained that if patient wanted the referral to pain management to let us know and we could place that referral for her.

## 2024-04-11 NOTE — TELEPHONE ENCOUNTER
PATIENT REQUESTING REFILL OF PAIN MEDS.  PATIENT IS AWARE THAT DR SERVIN DOESN'T WRITE FOR PAIN MEDS UNLESS THE PATIENT HAS HAD SURGERY.  PATIENT WAS ADVISED TO CALL PCP.  PATIENT INSISTED THAT DR SERVIN BE ASKED.  [facility]

## (undated) DEVICE — GLV SURG BIOGEL LTX PF 7 1/2

## (undated) DEVICE — Device

## (undated) DEVICE — Device: Brand: SINGLE USE INJECTOR NM600/610

## (undated) DEVICE — SOL IRR NACL 0.9PCT BT 1000ML

## (undated) DEVICE — CONN JET HYDRA H20 AUXILIARY DISP

## (undated) DEVICE — LINER SURG CANSTR SXN S/RIGD 1500CC

## (undated) DEVICE — SOL IRRG H2O PL/BG 1000ML STRL

## (undated) DEVICE — SOLIDIFIER LIQLOC PLS 1500CC BT

## (undated) DEVICE — SNAR E/S POLYP SNAREMASTER OVL/10MM 2.8X2300MM YEL

## (undated) DEVICE — THE SINGLE USE ETRAP – POLYP TRAP IS USED FOR SUCTION RETRIEVAL OF ENDOSCOPICALLY REMOVED POLYPS.: Brand: ETRAP

## (undated) DEVICE — KT SYR GEL ORISE SNGL PK 10ML

## (undated) DEVICE — Device: Brand: DEFENDO AIR/WATER/SUCTION AND BIOPSY VALVE